# Patient Record
Sex: FEMALE | Race: WHITE | NOT HISPANIC OR LATINO | Employment: OTHER | URBAN - METROPOLITAN AREA
[De-identification: names, ages, dates, MRNs, and addresses within clinical notes are randomized per-mention and may not be internally consistent; named-entity substitution may affect disease eponyms.]

---

## 2022-01-01 ENCOUNTER — HOSPITAL ENCOUNTER (INPATIENT)
Facility: HOSPITAL | Age: 67
LOS: 4 days | DRG: 834 | End: 2022-07-14
Attending: INTERNAL MEDICINE | Admitting: INTERNAL MEDICINE
Payer: MEDICARE

## 2022-01-01 VITALS
WEIGHT: 212.5 LBS | HEIGHT: 61 IN | RESPIRATION RATE: 28 BRPM | TEMPERATURE: 101 F | BODY MASS INDEX: 40.12 KG/M2 | DIASTOLIC BLOOD PRESSURE: 32 MMHG | OXYGEN SATURATION: 84 % | HEART RATE: 124 BPM | SYSTOLIC BLOOD PRESSURE: 72 MMHG

## 2022-01-01 DIAGNOSIS — C95.00 ACUTE LEUKEMIA NOT HAVING ACHIEVED REMISSION: ICD-10-CM

## 2022-01-01 DIAGNOSIS — R07.9 CHEST PAIN: ICD-10-CM

## 2022-01-01 DIAGNOSIS — Z91.89 AT RISK FOR PROLONGED QT INTERVAL SYNDROME: ICD-10-CM

## 2022-01-01 DIAGNOSIS — C95.00 LEUKEMIA, ACUTE: ICD-10-CM

## 2022-01-01 DIAGNOSIS — I48.91 ATRIAL FIBRILLATION: ICD-10-CM

## 2022-01-01 DIAGNOSIS — N17.9 AKI (ACUTE KIDNEY INJURY): ICD-10-CM

## 2022-01-01 DIAGNOSIS — J96.01 ACUTE HYPOXEMIC RESPIRATORY FAILURE: ICD-10-CM

## 2022-01-01 DIAGNOSIS — D69.6 THROMBOCYTOPENIA: ICD-10-CM

## 2022-01-01 DIAGNOSIS — R57.9 SHOCK: ICD-10-CM

## 2022-01-01 LAB
ABO + RH BLD: NORMAL
ADENOVIRUS: NOT DETECTED
ALBUMIN SERPL BCP-MCNC: 1.9 G/DL (ref 3.5–5.2)
ALBUMIN SERPL BCP-MCNC: 2.2 G/DL (ref 3.5–5.2)
ALBUMIN SERPL BCP-MCNC: 2.4 G/DL (ref 3.5–5.2)
ALBUMIN SERPL BCP-MCNC: 2.5 G/DL (ref 3.5–5.2)
ALBUMIN SERPL BCP-MCNC: 3 G/DL (ref 3.5–5.2)
ALBUMIN SERPL BCP-MCNC: 3.1 G/DL (ref 3.5–5.2)
ALLENS TEST: ABNORMAL
ALP SERPL-CCNC: 123 U/L (ref 55–135)
ALP SERPL-CCNC: 147 U/L (ref 55–135)
ALP SERPL-CCNC: 83 U/L (ref 55–135)
ALP SERPL-CCNC: 85 U/L (ref 55–135)
ALP SERPL-CCNC: 87 U/L (ref 55–135)
ALP SERPL-CCNC: 92 U/L (ref 55–135)
ALT SERPL W/O P-5'-P-CCNC: 20 U/L (ref 10–44)
ALT SERPL W/O P-5'-P-CCNC: 219 U/L (ref 10–44)
ALT SERPL W/O P-5'-P-CCNC: 280 U/L (ref 10–44)
ALT SERPL W/O P-5'-P-CCNC: 323 U/L (ref 10–44)
ALT SERPL W/O P-5'-P-CCNC: 35 U/L (ref 10–44)
ALT SERPL W/O P-5'-P-CCNC: 364 U/L (ref 10–44)
ANION GAP SERPL CALC-SCNC: 12 MMOL/L (ref 8–16)
ANION GAP SERPL CALC-SCNC: 13 MMOL/L (ref 8–16)
ANION GAP SERPL CALC-SCNC: 13 MMOL/L (ref 8–16)
ANION GAP SERPL CALC-SCNC: 15 MMOL/L (ref 8–16)
ANION GAP SERPL CALC-SCNC: 17 MMOL/L (ref 8–16)
ANION GAP SERPL CALC-SCNC: 18 MMOL/L (ref 8–16)
ANION GAP SERPL CALC-SCNC: 19 MMOL/L (ref 8–16)
ANISOCYTOSIS BLD QL SMEAR: ABNORMAL
ANISOCYTOSIS BLD QL SMEAR: SLIGHT
APTT BLDCRRT: 29.6 SEC (ref 21–32)
APTT BLDCRRT: 32.6 SEC (ref 21–32)
APTT BLDCRRT: 34.6 SEC (ref 21–32)
AST SERPL-CCNC: 150 U/L (ref 10–40)
AST SERPL-CCNC: 160 U/L (ref 10–40)
AST SERPL-CCNC: 219 U/L (ref 10–40)
AST SERPL-CCNC: 25 U/L (ref 10–40)
AST SERPL-CCNC: 37 U/L (ref 10–40)
AST SERPL-CCNC: 417 U/L (ref 10–40)
AV INDEX (PROSTH): 1.18
AV MEAN GRADIENT: 2 MMHG
AV PEAK GRADIENT: 3 MMHG
AV VALVE AREA: 3.55 CM2
AV VELOCITY RATIO: 1.05
B-OH-BUTYR BLD STRIP-SCNC: 0.1 MMOL/L (ref 0–0.5)
BACTERIA #/AREA URNS AUTO: ABNORMAL /HPF
BACTERIA UR CULT: NO GROWTH
BASO STIPL BLD QL SMEAR: ABNORMAL
BASO STIPL BLD QL SMEAR: ABNORMAL
BASOPHILS # BLD AUTO: ABNORMAL K/UL (ref 0–0.2)
BASOPHILS NFR BLD: 0 % (ref 0–1.9)
BILIRUB SERPL-MCNC: 0.5 MG/DL (ref 0.1–1)
BILIRUB SERPL-MCNC: 0.5 MG/DL (ref 0.1–1)
BILIRUB SERPL-MCNC: 0.9 MG/DL (ref 0.1–1)
BILIRUB SERPL-MCNC: 0.9 MG/DL (ref 0.1–1)
BILIRUB SERPL-MCNC: 1.6 MG/DL (ref 0.1–1)
BILIRUB SERPL-MCNC: 2.3 MG/DL (ref 0.1–1)
BILIRUB UR QL STRIP: NEGATIVE
BLASTS NFR BLD MANUAL: 0 %
BLASTS NFR BLD MANUAL: 11 %
BLASTS NFR BLD MANUAL: 11 %
BLASTS NFR BLD MANUAL: 15 %
BLASTS NFR BLD MANUAL: 18 %
BLASTS NFR BLD MANUAL: 19 %
BLD GP AB SCN CELLS X3 SERPL QL: NORMAL
BLD PROD TYP BPU: NORMAL
BLOOD UNIT EXPIRATION DATE: NORMAL
BLOOD UNIT TYPE CODE: 5100
BLOOD UNIT TYPE: NORMAL
BNP SERPL-MCNC: 215 PG/ML (ref 0–99)
BODY SITE - BONE MARROW: NORMAL
BORDETELLA PARAPERTUSSIS (IS1001): NOT DETECTED
BORDETELLA PERTUSSIS (PTXP): NOT DETECTED
BSA FOR ECHO PROCEDURE: 2.04 M2
BSA FOR ECHO PROCEDURE: 2.04 M2
BUN SERPL-MCNC: 16 MG/DL (ref 8–23)
BUN SERPL-MCNC: 22 MG/DL (ref 8–23)
BUN SERPL-MCNC: 25 MG/DL (ref 8–23)
BUN SERPL-MCNC: 29 MG/DL (ref 8–23)
BUN SERPL-MCNC: 35 MG/DL (ref 8–23)
BUN SERPL-MCNC: 41 MG/DL (ref 8–23)
BUN SERPL-MCNC: 52 MG/DL (ref 8–23)
BURR CELLS BLD QL SMEAR: ABNORMAL
CALCIUM SERPL-MCNC: 7.4 MG/DL (ref 8.7–10.5)
CALCIUM SERPL-MCNC: 8 MG/DL (ref 8.7–10.5)
CALCIUM SERPL-MCNC: 8 MG/DL (ref 8.7–10.5)
CALCIUM SERPL-MCNC: 8.4 MG/DL (ref 8.7–10.5)
CALCIUM SERPL-MCNC: 8.4 MG/DL (ref 8.7–10.5)
CALCIUM SERPL-MCNC: 8.5 MG/DL (ref 8.7–10.5)
CALCIUM SERPL-MCNC: 8.6 MG/DL (ref 8.7–10.5)
CHLAMYDIA PNEUMONIAE: NOT DETECTED
CHLORIDE SERPL-SCNC: 94 MMOL/L (ref 95–110)
CHLORIDE SERPL-SCNC: 95 MMOL/L (ref 95–110)
CHLORIDE SERPL-SCNC: 95 MMOL/L (ref 95–110)
CHLORIDE SERPL-SCNC: 96 MMOL/L (ref 95–110)
CHLORIDE SERPL-SCNC: 96 MMOL/L (ref 95–110)
CHLORIDE SERPL-SCNC: 97 MMOL/L (ref 95–110)
CHLORIDE SERPL-SCNC: 98 MMOL/L (ref 95–110)
CLARITY UR REFRACT.AUTO: ABNORMAL
CLINICAL DIAGNOSIS - BONE MARROW: NORMAL
CO2 SERPL-SCNC: 13 MMOL/L (ref 23–29)
CO2 SERPL-SCNC: 15 MMOL/L (ref 23–29)
CO2 SERPL-SCNC: 15 MMOL/L (ref 23–29)
CO2 SERPL-SCNC: 17 MMOL/L (ref 23–29)
CO2 SERPL-SCNC: 19 MMOL/L (ref 23–29)
CO2 SERPL-SCNC: 19 MMOL/L (ref 23–29)
CO2 SERPL-SCNC: 20 MMOL/L (ref 23–29)
CODING SYSTEM: NORMAL
COLOR UR AUTO: ABNORMAL
COMMENT: NORMAL
CORONAVIRUS 229E, COMMON COLD VIRUS: NOT DETECTED
CORONAVIRUS HKU1, COMMON COLD VIRUS: NOT DETECTED
CORONAVIRUS NL63, COMMON COLD VIRUS: NOT DETECTED
CORONAVIRUS OC43, COMMON COLD VIRUS: NOT DETECTED
CREAT SERPL-MCNC: 0.8 MG/DL (ref 0.5–1.4)
CREAT SERPL-MCNC: 0.8 MG/DL (ref 0.5–1.4)
CREAT SERPL-MCNC: 1.2 MG/DL (ref 0.5–1.4)
CREAT SERPL-MCNC: 1.3 MG/DL (ref 0.5–1.4)
CREAT SERPL-MCNC: 1.4 MG/DL (ref 0.5–1.4)
CREAT SERPL-MCNC: 1.8 MG/DL (ref 0.5–1.4)
CREAT SERPL-MCNC: 3 MG/DL (ref 0.5–1.4)
CV ECHO LV RWT: 0.32 CM
D DIMER PPP IA.FEU-MCNC: 12.46 MG/L FEU
DACRYOCYTES BLD QL SMEAR: ABNORMAL
DACRYOCYTES BLD QL SMEAR: ABNORMAL
DELSYS: ABNORMAL
DIFFERENTIAL METHOD: ABNORMAL
DISPENSE STATUS: NORMAL
DOHLE BOD BLD QL SMEAR: PRESENT
DOP CALC AO PEAK VEL: 0.82 M/S
DOP CALC AO VTI: 12.47 CM
DOP CALC LVOT AREA: 3 CM2
DOP CALC LVOT DIAMETER: 1.96 CM
DOP CALC LVOT PEAK VEL: 0.86 M/S
DOP CALC LVOT STROKE VOLUME: 44.27 CM3
DOP CALCLVOT PEAK VEL VTI: 14.68 CM
E WAVE DECELERATION TIME: 118.61 MSEC
E/A RATIO: 0.7
ECHO LV POSTERIOR WALL: 0.66 CM (ref 0.6–1.1)
EJECTION FRACTION: 63 %
EOSINOPHIL # BLD AUTO: ABNORMAL K/UL (ref 0–0.5)
EOSINOPHIL NFR BLD: 0 % (ref 0–8)
EP: 6
ERYTHROCYTE [DISTWIDTH] IN BLOOD BY AUTOMATED COUNT: 17.2 % (ref 11.5–14.5)
ERYTHROCYTE [DISTWIDTH] IN BLOOD BY AUTOMATED COUNT: 17.4 % (ref 11.5–14.5)
ERYTHROCYTE [DISTWIDTH] IN BLOOD BY AUTOMATED COUNT: 17.6 % (ref 11.5–14.5)
ERYTHROCYTE [DISTWIDTH] IN BLOOD BY AUTOMATED COUNT: 17.8 % (ref 11.5–14.5)
ERYTHROCYTE [DISTWIDTH] IN BLOOD BY AUTOMATED COUNT: 18.5 % (ref 11.5–14.5)
ERYTHROCYTE [DISTWIDTH] IN BLOOD BY AUTOMATED COUNT: 18.7 % (ref 11.5–14.5)
ERYTHROCYTE [DISTWIDTH] IN BLOOD BY AUTOMATED COUNT: 19.4 % (ref 11.5–14.5)
ERYTHROCYTE [DISTWIDTH] IN BLOOD BY AUTOMATED COUNT: 19.6 % (ref 11.5–14.5)
ERYTHROCYTE [DISTWIDTH] IN BLOOD BY AUTOMATED COUNT: 19.6 % (ref 11.5–14.5)
ERYTHROCYTE [DISTWIDTH] IN BLOOD BY AUTOMATED COUNT: 20 % (ref 11.5–14.5)
ERYTHROCYTE [DISTWIDTH] IN BLOOD BY AUTOMATED COUNT: 20.7 % (ref 11.5–14.5)
ERYTHROCYTE [SEDIMENTATION RATE] IN BLOOD BY WESTERGREN METHOD: 12 MM/H
ERYTHROCYTE [SEDIMENTATION RATE] IN BLOOD BY WESTERGREN METHOD: 26 MM/H
EST. GFR  (AFRICAN AMERICAN): 17.7 ML/MIN/1.73 M^2
EST. GFR  (AFRICAN AMERICAN): 32.9 ML/MIN/1.73 M^2
EST. GFR  (AFRICAN AMERICAN): 44.5 ML/MIN/1.73 M^2
EST. GFR  (AFRICAN AMERICAN): 48.7 ML/MIN/1.73 M^2
EST. GFR  (AFRICAN AMERICAN): 53.7 ML/MIN/1.73 M^2
EST. GFR  (AFRICAN AMERICAN): >60 ML/MIN/1.73 M^2
EST. GFR  (AFRICAN AMERICAN): >60 ML/MIN/1.73 M^2
EST. GFR  (NON AFRICAN AMERICAN): 15.4 ML/MIN/1.73 M^2
EST. GFR  (NON AFRICAN AMERICAN): 28.5 ML/MIN/1.73 M^2
EST. GFR  (NON AFRICAN AMERICAN): 38.6 ML/MIN/1.73 M^2
EST. GFR  (NON AFRICAN AMERICAN): 42.3 ML/MIN/1.73 M^2
EST. GFR  (NON AFRICAN AMERICAN): 46.5 ML/MIN/1.73 M^2
EST. GFR  (NON AFRICAN AMERICAN): >60 ML/MIN/1.73 M^2
EST. GFR  (NON AFRICAN AMERICAN): >60 ML/MIN/1.73 M^2
ESTIMATED AVG GLUCOSE: 137 MG/DL (ref 68–131)
FERRITIN SERPL-MCNC: 2795 NG/ML (ref 20–300)
FIBRINOGEN PPP-MCNC: 415 MG/DL (ref 182–400)
FIBRINOGEN PPP-MCNC: 478 MG/DL (ref 182–400)
FIBRINOGEN PPP-MCNC: 691 MG/DL (ref 182–400)
FINAL PATHOLOGIC DIAGNOSIS: NORMAL
FIO2: 100
FIO2: 40
FIO2: 50
FIO2: 60
FIO2: 70
FIO2: 80
FLOW CYTOMETRY ANTIBODIES ANALYZED - BLOOD: NORMAL
FLOW CYTOMETRY ANTIBODIES ANALYZED - BONE MARROW: NORMAL
FLOW CYTOMETRY COMMENT - BLOOD: NORMAL
FLOW CYTOMETRY COMMENT - BONE MARROW: NORMAL
FLOW CYTOMETRY INTERPRETATION - BLOOD: NORMAL
FLOW CYTOMETRY INTERPRETATION - BONE MARROW: NORMAL
FLT3 RESULT: NORMAL
FLUBV RNA NPH QL NAA+NON-PROBE: NOT DETECTED
FRACTIONAL SHORTENING: 21 % (ref 28–44)
GIANT PLATELETS BLD QL SMEAR: PRESENT
GLUCOSE SERPL-MCNC: 116 MG/DL (ref 70–110)
GLUCOSE SERPL-MCNC: 133 MG/DL (ref 70–110)
GLUCOSE SERPL-MCNC: 133 MG/DL (ref 70–110)
GLUCOSE SERPL-MCNC: 139 MG/DL (ref 70–110)
GLUCOSE SERPL-MCNC: 159 MG/DL (ref 70–110)
GLUCOSE SERPL-MCNC: 175 MG/DL (ref 70–110)
GLUCOSE SERPL-MCNC: 230 MG/DL (ref 70–110)
GLUCOSE UR QL STRIP: ABNORMAL
GROSS: NORMAL
HAV IGM SERPL QL IA: NEGATIVE
HBA1C MFR BLD: 6.4 % (ref 4–5.6)
HBV CORE IGM SERPL QL IA: NEGATIVE
HBV SURFACE AG SERPL QL IA: NEGATIVE
HCO3 UR-SCNC: 12.7 MMOL/L (ref 24–28)
HCO3 UR-SCNC: 15.7 MMOL/L (ref 24–28)
HCO3 UR-SCNC: 15.8 MMOL/L (ref 24–28)
HCO3 UR-SCNC: 16.9 MMOL/L (ref 24–28)
HCO3 UR-SCNC: 20.6 MMOL/L (ref 24–28)
HCO3 UR-SCNC: 21.9 MMOL/L (ref 24–28)
HCO3 UR-SCNC: 23.3 MMOL/L (ref 24–28)
HCO3 UR-SCNC: 23.5 MMOL/L (ref 24–28)
HCT VFR BLD AUTO: 18.5 % (ref 37–48.5)
HCT VFR BLD AUTO: 19.9 % (ref 37–48.5)
HCT VFR BLD AUTO: 19.9 % (ref 37–48.5)
HCT VFR BLD AUTO: 20.4 % (ref 37–48.5)
HCT VFR BLD AUTO: 20.5 % (ref 37–48.5)
HCT VFR BLD AUTO: 20.9 % (ref 37–48.5)
HCT VFR BLD AUTO: 23 % (ref 37–48.5)
HCT VFR BLD AUTO: 23.3 % (ref 37–48.5)
HCT VFR BLD AUTO: 23.5 % (ref 37–48.5)
HCT VFR BLD AUTO: 23.8 % (ref 37–48.5)
HCT VFR BLD AUTO: 24.4 % (ref 37–48.5)
HCT VFR BLD AUTO: 25.6 % (ref 37–48.5)
HCT VFR BLD AUTO: 26.9 % (ref 37–48.5)
HCT VFR BLD CALC: 23 %PCV (ref 36–54)
HCT VFR BLD CALC: 26 %PCV (ref 36–54)
HCV AB SERPL QL IA: NEGATIVE
HGB BLD-MCNC: 6.3 G/DL (ref 12–16)
HGB BLD-MCNC: 6.8 G/DL (ref 12–16)
HGB BLD-MCNC: 6.9 G/DL (ref 12–16)
HGB BLD-MCNC: 7 G/DL (ref 12–16)
HGB BLD-MCNC: 7.1 G/DL (ref 12–16)
HGB BLD-MCNC: 7.3 G/DL (ref 12–16)
HGB BLD-MCNC: 7.9 G/DL (ref 12–16)
HGB BLD-MCNC: 7.9 G/DL (ref 12–16)
HGB BLD-MCNC: 8.1 G/DL (ref 12–16)
HGB BLD-MCNC: 8.2 G/DL (ref 12–16)
HGB BLD-MCNC: 8.3 G/DL (ref 12–16)
HGB BLD-MCNC: 8.7 G/DL (ref 12–16)
HGB BLD-MCNC: 9 G/DL (ref 12–16)
HGB UR QL STRIP: ABNORMAL
HIV 1+2 AB+HIV1 P24 AG SERPL QL IA: NEGATIVE
HPIV1 RNA NPH QL NAA+NON-PROBE: NOT DETECTED
HPIV2 RNA NPH QL NAA+NON-PROBE: NOT DETECTED
HPIV3 RNA NPH QL NAA+NON-PROBE: NOT DETECTED
HPIV4 RNA NPH QL NAA+NON-PROBE: NOT DETECTED
HUMAN METAPNEUMOVIRUS: NOT DETECTED
HYALINE CASTS UR QL AUTO: 34 /LPF
HYPOCHROMIA BLD QL SMEAR: ABNORMAL
IMM GRANULOCYTES # BLD AUTO: ABNORMAL K/UL (ref 0–0.04)
IMM GRANULOCYTES NFR BLD AUTO: ABNORMAL % (ref 0–0.5)
INFLUENZA A (SUBTYPES H1,H1-2009,H3): NOT DETECTED
INR PPP: 1.2 (ref 0.8–1.2)
INR PPP: 1.3 (ref 0.8–1.2)
INR PPP: 1.3 (ref 0.8–1.2)
INR PPP: 1.4 (ref 0.8–1.2)
INTERVENTRICULAR SEPTUM: 0.69 CM (ref 0.6–1.1)
IP: 12
IP: 12
IP: 14
IRON SERPL-MCNC: 75 UG/DL (ref 30–160)
KETONES UR QL STRIP: ABNORMAL
LACTATE SERPL-SCNC: 1.4 MMOL/L (ref 0.5–2.2)
LACTATE SERPL-SCNC: 2.3 MMOL/L (ref 0.5–2.2)
LACTATE SERPL-SCNC: 2.4 MMOL/L (ref 0.5–2.2)
LACTATE SERPL-SCNC: 2.8 MMOL/L (ref 0.5–2.2)
LACTATE SERPL-SCNC: 2.9 MMOL/L (ref 0.5–2.2)
LACTATE SERPL-SCNC: 4.3 MMOL/L (ref 0.5–2.2)
LACTATE SERPL-SCNC: 4.8 MMOL/L (ref 0.5–2.2)
LACTATE SERPL-SCNC: 5.5 MMOL/L (ref 0.5–2.2)
LACTATE SERPL-SCNC: 8.3 MMOL/L (ref 0.5–2.2)
LACTATE SERPL-SCNC: 9.8 MMOL/L (ref 0.5–2.2)
LDH SERPL L TO P-CCNC: 1059 U/L (ref 110–260)
LDH SERPL L TO P-CCNC: 1448 U/L (ref 110–260)
LDH SERPL L TO P-CCNC: 646 U/L (ref 110–260)
LEFT ATRIUM SIZE: 2.63 CM
LEFT INTERNAL DIMENSION IN SYSTOLE: 3.27 CM (ref 2.1–4)
LEFT VENTRICLE DIASTOLIC VOLUME INDEX: 39.44 ML/M2
LEFT VENTRICLE DIASTOLIC VOLUME: 76.51 ML
LEFT VENTRICLE MASS INDEX: 41 G/M2
LEFT VENTRICLE SYSTOLIC VOLUME INDEX: 22.3 ML/M2
LEFT VENTRICLE SYSTOLIC VOLUME: 43.3 ML
LEFT VENTRICULAR INTERNAL DIMENSION IN DIASTOLE: 4.15 CM (ref 3.5–6)
LEFT VENTRICULAR MASS: 79.56 G
LEUKOCYTE ESTERASE UR QL STRIP: NEGATIVE
LIPASE SERPL-CCNC: <4 U/L (ref 4–60)
LV LATERAL E/E' RATIO: 7.5 M/S
LYMPHOCYTES # BLD AUTO: ABNORMAL K/UL (ref 1–4.8)
LYMPHOCYTES NFR BLD: 10 % (ref 18–48)
LYMPHOCYTES NFR BLD: 12 % (ref 18–48)
LYMPHOCYTES NFR BLD: 13 % (ref 18–48)
LYMPHOCYTES NFR BLD: 14 % (ref 18–48)
LYMPHOCYTES NFR BLD: 16 % (ref 18–48)
LYMPHOCYTES NFR BLD: 18 % (ref 18–48)
LYMPHOCYTES NFR BLD: 18 % (ref 18–48)
LYMPHOCYTES NFR BLD: 19 % (ref 18–48)
LYMPHOCYTES NFR BLD: 19 % (ref 18–48)
LYMPHOCYTES NFR BLD: 21 % (ref 18–48)
LYMPHOCYTES NFR BLD: 25 % (ref 18–48)
LYMPHOCYTES NFR BLD: 26 % (ref 18–48)
LYMPHOCYTES NFR BLD: 9 % (ref 18–48)
Lab: NORMAL
MAGNESIUM SERPL-MCNC: 1.6 MG/DL (ref 1.6–2.6)
MAGNESIUM SERPL-MCNC: 2.4 MG/DL (ref 1.6–2.6)
MAGNESIUM SERPL-MCNC: 2.4 MG/DL (ref 1.6–2.6)
MCH RBC QN AUTO: 31.8 PG (ref 27–31)
MCH RBC QN AUTO: 32 PG (ref 27–31)
MCH RBC QN AUTO: 32 PG (ref 27–31)
MCH RBC QN AUTO: 32.1 PG (ref 27–31)
MCH RBC QN AUTO: 32.3 PG (ref 27–31)
MCH RBC QN AUTO: 32.6 PG (ref 27–31)
MCH RBC QN AUTO: 33 PG (ref 27–31)
MCH RBC QN AUTO: 33.2 PG (ref 27–31)
MCH RBC QN AUTO: 33.2 PG (ref 27–31)
MCH RBC QN AUTO: 33.3 PG (ref 27–31)
MCH RBC QN AUTO: 33.3 PG (ref 27–31)
MCH RBC QN AUTO: 34.1 PG (ref 27–31)
MCH RBC QN AUTO: 34.5 PG (ref 27–31)
MCHC RBC AUTO-ENTMCNC: 33.5 G/DL (ref 32–36)
MCHC RBC AUTO-ENTMCNC: 33.6 G/DL (ref 32–36)
MCHC RBC AUTO-ENTMCNC: 33.7 G/DL (ref 32–36)
MCHC RBC AUTO-ENTMCNC: 33.9 G/DL (ref 32–36)
MCHC RBC AUTO-ENTMCNC: 34 G/DL (ref 32–36)
MCHC RBC AUTO-ENTMCNC: 34.1 G/DL (ref 32–36)
MCHC RBC AUTO-ENTMCNC: 34.2 G/DL (ref 32–36)
MCHC RBC AUTO-ENTMCNC: 34.3 G/DL (ref 32–36)
MCHC RBC AUTO-ENTMCNC: 34.9 G/DL (ref 32–36)
MCHC RBC AUTO-ENTMCNC: 35.7 G/DL (ref 32–36)
MCHC RBC AUTO-ENTMCNC: 35.7 G/DL (ref 32–36)
MCV RBC AUTO: 100 FL (ref 82–98)
MCV RBC AUTO: 101 FL (ref 82–98)
MCV RBC AUTO: 91 FL (ref 82–98)
MCV RBC AUTO: 93 FL (ref 82–98)
MCV RBC AUTO: 94 FL (ref 82–98)
MCV RBC AUTO: 95 FL (ref 82–98)
MCV RBC AUTO: 96 FL (ref 82–98)
MCV RBC AUTO: 97 FL (ref 82–98)
MCV RBC AUTO: 99 FL (ref 82–98)
METAMYELOCYTES NFR BLD MANUAL: 1 %
METAMYELOCYTES NFR BLD MANUAL: 2 %
METAMYELOCYTES NFR BLD MANUAL: 3 %
MICROSCOPIC COMMENT: ABNORMAL
MICROSCOPIC EXAM: NORMAL
MIN VOL: 11.3
MIN VOL: 8.3
MIN VOL: 9
MODE: ABNORMAL
MONOCYTES # BLD AUTO: ABNORMAL K/UL (ref 0.3–1)
MONOCYTES NFR BLD: 10 % (ref 4–15)
MONOCYTES NFR BLD: 11 % (ref 4–15)
MONOCYTES NFR BLD: 12 % (ref 4–15)
MONOCYTES NFR BLD: 14 % (ref 4–15)
MONOCYTES NFR BLD: 17 % (ref 4–15)
MONOCYTES NFR BLD: 4 % (ref 4–15)
MONOCYTES NFR BLD: 5 % (ref 4–15)
MONOCYTES NFR BLD: 6 % (ref 4–15)
MONOCYTES NFR BLD: 6 % (ref 4–15)
MONOCYTES NFR BLD: 7 % (ref 4–15)
MONOCYTES NFR BLD: 7 % (ref 4–15)
MONOCYTES NFR BLD: 9 % (ref 4–15)
MONOCYTES NFR BLD: 9 % (ref 4–15)
MV PEAK A VEL: 0.64 M/S
MV PEAK E VEL: 0.45 M/S
MV STENOSIS PRESSURE HALF TIME: 34.4 MS
MV VALVE AREA P 1/2 METHOD: 6.4 CM2
MYCOPLASMA PNEUMONIAE: NOT DETECTED
MYELOCYTES NFR BLD MANUAL: 1 %
MYELOCYTES NFR BLD MANUAL: 2 %
MYELOCYTES NFR BLD MANUAL: 2 %
MYELOCYTES NFR BLD MANUAL: 3 %
MYELOCYTES NFR BLD MANUAL: 4 %
MYELOCYTES NFR BLD MANUAL: 5 %
MYELOCYTES NFR BLD MANUAL: 6 %
NEUTROPHILS NFR BLD: 13 % (ref 38–73)
NEUTROPHILS NFR BLD: 18 % (ref 38–73)
NEUTROPHILS NFR BLD: 25 % (ref 38–73)
NEUTROPHILS NFR BLD: 32 % (ref 38–73)
NEUTROPHILS NFR BLD: 33 % (ref 38–73)
NEUTROPHILS NFR BLD: 34 % (ref 38–73)
NEUTROPHILS NFR BLD: 34 % (ref 38–73)
NEUTROPHILS NFR BLD: 39 % (ref 38–73)
NEUTROPHILS NFR BLD: 45 % (ref 38–73)
NEUTROPHILS NFR BLD: 46 % (ref 38–73)
NEUTROPHILS NFR BLD: 51 % (ref 38–73)
NEUTROPHILS NFR BLD: 56 % (ref 38–73)
NEUTROPHILS NFR BLD: 58 % (ref 38–73)
NEUTS BAND NFR BLD MANUAL: 1 %
NEUTS BAND NFR BLD MANUAL: 2 %
NEUTS BAND NFR BLD MANUAL: 2 %
NEUTS BAND NFR BLD MANUAL: 3 %
NEUTS BAND NFR BLD MANUAL: 3 %
NEUTS BAND NFR BLD MANUAL: 4 %
NEUTS BAND NFR BLD MANUAL: 5 %
NEUTS BAND NFR BLD MANUAL: 6 %
NEUTS BAND NFR BLD MANUAL: 6 %
NEUTS BAND NFR BLD MANUAL: 7 %
NEUTS BAND NFR BLD MANUAL: 7 %
NITRITE UR QL STRIP: NEGATIVE
NRBC BLD-RTO: 0 /100 WBC
NRBC BLD-RTO: 1 /100 WBC
NUM UNITS TRANS PACKED RBC: NORMAL
NUM UNITS TRANS PACKED RBC: NORMAL
OSMOLALITY SERPL: 277 MOSM/KG (ref 275–295)
OVALOCYTES BLD QL SMEAR: ABNORMAL
PATH REPORT.FINAL DX SPEC: NORMAL
PATH REV BLD -IMP: NORMAL
PCO2 BLDA: 26.3 MMHG (ref 35–45)
PCO2 BLDA: 28.5 MMHG (ref 35–45)
PCO2 BLDA: 30.3 MMHG (ref 35–45)
PCO2 BLDA: 31 MMHG (ref 35–45)
PCO2 BLDA: 32.1 MMHG (ref 35–45)
PCO2 BLDA: 35.6 MMHG (ref 35–45)
PCO2 BLDA: 36.1 MMHG (ref 35–45)
PCO2 BLDA: 45.9 MMHG (ref 35–45)
PEEP: 10
PEEP: 8
PEEP: 8
PH SMN: 7.17 [PH] (ref 7.35–7.45)
PH SMN: 7.2 [PH] (ref 7.35–7.45)
PH SMN: 7.33 [PH] (ref 7.35–7.45)
PH SMN: 7.35 [PH] (ref 7.35–7.45)
PH SMN: 7.37 [PH] (ref 7.35–7.45)
PH SMN: 7.42 [PH] (ref 7.35–7.45)
PH SMN: 7.46 [PH] (ref 7.35–7.45)
PH SMN: 7.55 [PH] (ref 7.35–7.45)
PH UR STRIP: 5 [PH] (ref 5–8)
PHOSPHATE SERPL-MCNC: 2.1 MG/DL (ref 2.7–4.5)
PHOSPHATE SERPL-MCNC: 2.1 MG/DL (ref 2.7–4.5)
PHOSPHATE SERPL-MCNC: 2.7 MG/DL (ref 2.7–4.5)
PHOSPHATE SERPL-MCNC: 3 MG/DL (ref 2.7–4.5)
PIP: 28
PIP: 35
PLATELET # BLD AUTO: 13 K/UL (ref 150–450)
PLATELET # BLD AUTO: 14 K/UL (ref 150–450)
PLATELET # BLD AUTO: 15 K/UL (ref 150–450)
PLATELET # BLD AUTO: 19 K/UL (ref 150–450)
PLATELET # BLD AUTO: 20 K/UL (ref 150–450)
PLATELET # BLD AUTO: 22 K/UL (ref 150–450)
PLATELET # BLD AUTO: 28 K/UL (ref 150–450)
PLATELET # BLD AUTO: 37 K/UL (ref 150–450)
PLATELET # BLD AUTO: 39 K/UL (ref 150–450)
PLATELET # BLD AUTO: 40 K/UL (ref 150–450)
PLATELET # BLD AUTO: 41 K/UL (ref 150–450)
PLATELET BLD QL SMEAR: ABNORMAL
PMV BLD AUTO: 10 FL (ref 9.2–12.9)
PMV BLD AUTO: 10.2 FL (ref 9.2–12.9)
PMV BLD AUTO: 10.7 FL (ref 9.2–12.9)
PMV BLD AUTO: 10.9 FL (ref 9.2–12.9)
PMV BLD AUTO: 11.5 FL (ref 9.2–12.9)
PMV BLD AUTO: 11.9 FL (ref 9.2–12.9)
PMV BLD AUTO: 12.2 FL (ref 9.2–12.9)
PMV BLD AUTO: 12.3 FL (ref 9.2–12.9)
PMV BLD AUTO: 12.6 FL (ref 9.2–12.9)
PMV BLD AUTO: 8.9 FL (ref 9.2–12.9)
PMV BLD AUTO: ABNORMAL FL (ref 9.2–12.9)
PO2 BLDA: 103 MMHG (ref 80–100)
PO2 BLDA: 114 MMHG (ref 80–100)
PO2 BLDA: 29 MMHG (ref 40–60)
PO2 BLDA: 60 MMHG (ref 80–100)
PO2 BLDA: 74 MMHG (ref 80–100)
PO2 BLDA: 84 MMHG (ref 80–100)
PO2 BLDA: 85 MMHG (ref 80–100)
PO2 BLDA: 90 MMHG (ref 80–100)
POC BE: -1 MMOL/L
POC BE: -10 MMOL/L
POC BE: -10 MMOL/L
POC BE: -12 MMOL/L
POC BE: -15 MMOL/L
POC BE: -2 MMOL/L
POC BE: -5 MMOL/L
POC BE: 1 MMOL/L
POC IONIZED CALCIUM: 1.11 MMOL/L (ref 1.06–1.42)
POC SATURATED O2: 41 % (ref 95–100)
POC SATURATED O2: 89 % (ref 95–100)
POC SATURATED O2: 91 % (ref 95–100)
POC SATURATED O2: 96 % (ref 95–100)
POC SATURATED O2: 97 % (ref 95–100)
POC SATURATED O2: 98 % (ref 95–100)
POC TCO2: 14 MMOL/L (ref 23–27)
POC TCO2: 17 MMOL/L (ref 23–27)
POC TCO2: 17 MMOL/L (ref 23–27)
POC TCO2: 18 MMOL/L (ref 24–29)
POC TCO2: 22 MMOL/L (ref 23–27)
POC TCO2: 23 MMOL/L (ref 23–27)
POC TCO2: 24 MMOL/L (ref 23–27)
POC TCO2: 25 MMOL/L (ref 23–27)
POCT GLUCOSE: 106 MG/DL (ref 70–110)
POCT GLUCOSE: 123 MG/DL (ref 70–110)
POCT GLUCOSE: 130 MG/DL (ref 70–110)
POCT GLUCOSE: 135 MG/DL (ref 70–110)
POCT GLUCOSE: 137 MG/DL (ref 70–110)
POCT GLUCOSE: 147 MG/DL (ref 70–110)
POCT GLUCOSE: 151 MG/DL (ref 70–110)
POCT GLUCOSE: 153 MG/DL (ref 70–110)
POCT GLUCOSE: 180 MG/DL (ref 70–110)
POCT GLUCOSE: 181 MG/DL (ref 70–110)
POCT GLUCOSE: 212 MG/DL (ref 70–110)
POCT GLUCOSE: 217 MG/DL (ref 70–110)
POCT GLUCOSE: 238 MG/DL (ref 70–110)
POCT GLUCOSE: 267 MG/DL (ref 70–110)
POCT GLUCOSE: 69 MG/DL (ref 70–110)
POCT GLUCOSE: <20 MG/DL (ref 70–110)
POIKILOCYTOSIS BLD QL SMEAR: ABNORMAL
POIKILOCYTOSIS BLD QL SMEAR: SLIGHT
POLYCHROMASIA BLD QL SMEAR: ABNORMAL
POTASSIUM BLD-SCNC: 3.5 MMOL/L (ref 3.5–5.1)
POTASSIUM SERPL-SCNC: 3.6 MMOL/L (ref 3.5–5.1)
POTASSIUM SERPL-SCNC: 3.6 MMOL/L (ref 3.5–5.1)
POTASSIUM SERPL-SCNC: 3.8 MMOL/L (ref 3.5–5.1)
POTASSIUM SERPL-SCNC: 4 MMOL/L (ref 3.5–5.1)
POTASSIUM SERPL-SCNC: 4.2 MMOL/L (ref 3.5–5.1)
POTASSIUM SERPL-SCNC: 4.5 MMOL/L (ref 3.5–5.1)
POTASSIUM SERPL-SCNC: 5 MMOL/L (ref 3.5–5.1)
PROCALCITONIN SERPL IA-MCNC: 4.66 NG/ML
PROMYELOCYTES NFR BLD MANUAL: 1 %
PROMYELOCYTES NFR BLD MANUAL: 2 %
PROMYELOCYTES NFR BLD MANUAL: 2 %
PROT SERPL-MCNC: 5.9 G/DL (ref 6–8.4)
PROT SERPL-MCNC: 6.2 G/DL (ref 6–8.4)
PROT SERPL-MCNC: 6.3 G/DL (ref 6–8.4)
PROT SERPL-MCNC: 6.3 G/DL (ref 6–8.4)
PROT SERPL-MCNC: 6.5 G/DL (ref 6–8.4)
PROT SERPL-MCNC: 6.9 G/DL (ref 6–8.4)
PROT UR QL STRIP: ABNORMAL
PROTHROMBIN TIME: 12.7 SEC (ref 9–12.5)
PROTHROMBIN TIME: 12.9 SEC (ref 9–12.5)
PROTHROMBIN TIME: 13.1 SEC (ref 9–12.5)
PROTHROMBIN TIME: 14.3 SEC (ref 9–12.5)
RBC # BLD AUTO: 1.9 M/UL (ref 4–5.4)
RBC # BLD AUTO: 2.08 M/UL (ref 4–5.4)
RBC # BLD AUTO: 2.08 M/UL (ref 4–5.4)
RBC # BLD AUTO: 2.1 M/UL (ref 4–5.4)
RBC # BLD AUTO: 2.12 M/UL (ref 4–5.4)
RBC # BLD AUTO: 2.24 M/UL (ref 4–5.4)
RBC # BLD AUTO: 2.35 M/UL (ref 4–5.4)
RBC # BLD AUTO: 2.47 M/UL (ref 4–5.4)
RBC # BLD AUTO: 2.47 M/UL (ref 4–5.4)
RBC # BLD AUTO: 2.54 M/UL (ref 4–5.4)
RBC # BLD AUTO: 2.61 M/UL (ref 4–5.4)
RBC # BLD AUTO: 2.64 M/UL (ref 4–5.4)
RBC # BLD AUTO: 2.7 M/UL (ref 4–5.4)
RBC #/AREA URNS AUTO: 1 /HPF (ref 0–4)
RESPIRATORY INFECTION PANEL SOURCE: NORMAL
RSV RNA NPH QL NAA+NON-PROBE: NOT DETECTED
RV+EV RNA NPH QL NAA+NON-PROBE: NOT DETECTED
SAMPLE: ABNORMAL
SARS-COV-2 RNA RESP QL NAA+PROBE: NOT DETECTED
SATURATED IRON: 31 % (ref 20–50)
SCHISTOCYTES BLD QL SMEAR: ABNORMAL
SCHISTOCYTES BLD QL SMEAR: PRESENT
SCHISTOCYTES BLD QL SMEAR: PRESENT
SINUS: 3.55 CM
SITE: ABNORMAL
SMUDGE CELLS BLD QL SMEAR: PRESENT
SODIUM BLD-SCNC: 130 MMOL/L (ref 136–145)
SODIUM SERPL-SCNC: 124 MMOL/L (ref 136–145)
SODIUM SERPL-SCNC: 126 MMOL/L (ref 136–145)
SODIUM SERPL-SCNC: 127 MMOL/L (ref 136–145)
SODIUM SERPL-SCNC: 129 MMOL/L (ref 136–145)
SODIUM SERPL-SCNC: 132 MMOL/L (ref 136–145)
SP GR UR STRIP: >1.03 (ref 1–1.03)
SP02: 90
SP02: 91
SP02: 94
SP02: 95
SPECIMEN TYPE: NORMAL
SPHEROCYTES BLD QL SMEAR: ABNORMAL
SPHEROCYTES BLD QL SMEAR: ABNORMAL
SPONT RATE: 22
SQUAMOUS #/AREA URNS AUTO: 2 /HPF
TDI LATERAL: 0.06 M/S
TOTAL IRON BINDING CAPACITY: 244 UG/DL (ref 250–450)
TRANSFERRIN SERPL-MCNC: 165 MG/DL (ref 200–375)
TROPONIN I SERPL DL<=0.01 NG/ML-MCNC: 0.09 NG/ML (ref 0–0.03)
TROPONIN I SERPL DL<=0.01 NG/ML-MCNC: 0.1 NG/ML (ref 0–0.03)
TROPONIN I SERPL DL<=0.01 NG/ML-MCNC: 0.11 NG/ML (ref 0–0.03)
TROPONIN I SERPL DL<=0.01 NG/ML-MCNC: 0.11 NG/ML (ref 0–0.03)
UNIT NUMBER: NORMAL
URATE SERPL-MCNC: 5.4 MG/DL (ref 2.4–5.7)
URATE SERPL-MCNC: 8 MG/DL (ref 2.4–5.7)
URATE SERPL-MCNC: 9.2 MG/DL (ref 2.4–5.7)
URN SPEC COLLECT METH UR: ABNORMAL
VANCOMYCIN SERPL-MCNC: 8.3 UG/ML
VANCOMYCIN SERPL-MCNC: 9.4 UG/ML
VT: 340
WBC # BLD AUTO: 14.92 K/UL (ref 3.9–12.7)
WBC # BLD AUTO: 15.48 K/UL (ref 3.9–12.7)
WBC # BLD AUTO: 16.68 K/UL (ref 3.9–12.7)
WBC # BLD AUTO: 17.32 K/UL (ref 3.9–12.7)
WBC # BLD AUTO: 17.84 K/UL (ref 3.9–12.7)
WBC # BLD AUTO: 18.19 K/UL (ref 3.9–12.7)
WBC # BLD AUTO: 18.24 K/UL (ref 3.9–12.7)
WBC # BLD AUTO: 21 K/UL (ref 3.9–12.7)
WBC # BLD AUTO: 21.28 K/UL (ref 3.9–12.7)
WBC # BLD AUTO: 23.78 K/UL (ref 3.9–12.7)
WBC # BLD AUTO: 25.82 K/UL (ref 3.9–12.7)
WBC # BLD AUTO: 26.37 K/UL (ref 3.9–12.7)
WBC # BLD AUTO: 38.19 K/UL (ref 3.9–12.7)
WBC #/AREA URNS AUTO: 5 /HPF (ref 0–5)
WBC OTHER NFR BLD MANUAL: 17 %
WBC OTHER NFR BLD MANUAL: 19 %
WBC OTHER NFR BLD MANUAL: 23 %
WBC OTHER NFR BLD MANUAL: 24 %
WBC OTHER NFR BLD MANUAL: 26 %
WBC OTHER NFR BLD MANUAL: 31 %
WBC OTHER NFR BLD MANUAL: 32 %
WBC OTHER NFR BLD MANUAL: 33 %
WBC OTHER NFR BLD MANUAL: 45 %
WBC OTHER NFR BLD MANUAL: 50 %
WBC TOXIC VACUOLES BLD QL SMEAR: PRESENT

## 2022-01-01 PROCEDURE — 99233 SBSQ HOSP IP/OBS HIGH 50: CPT | Mod: ,,, | Performed by: INTERNAL MEDICINE

## 2022-01-01 PROCEDURE — 88184 FLOWCYTOMETRY/ TC 1 MARKER: CPT | Performed by: PATHOLOGY

## 2022-01-01 PROCEDURE — P9040 RBC LEUKOREDUCED IRRADIATED: HCPCS | Performed by: NURSE PRACTITIONER

## 2022-01-01 PROCEDURE — 84145 PROCALCITONIN (PCT): CPT | Performed by: INTERNAL MEDICINE

## 2022-01-01 PROCEDURE — 99291 PR CRITICAL CARE, E/M 30-74 MINUTES: ICD-10-PCS | Mod: ,,, | Performed by: NURSE PRACTITIONER

## 2022-01-01 PROCEDURE — 63600175 PHARM REV CODE 636 W HCPCS: Performed by: INTERNAL MEDICINE

## 2022-01-01 PROCEDURE — 85610 PROTHROMBIN TIME: CPT | Performed by: INTERNAL MEDICINE

## 2022-01-01 PROCEDURE — 94660 CPAP INITIATION&MGMT: CPT

## 2022-01-01 PROCEDURE — 88275 CYTOGENETICS 100-300: CPT | Mod: 59 | Performed by: NURSE PRACTITIONER

## 2022-01-01 PROCEDURE — 99291 CRITICAL CARE FIRST HOUR: CPT | Mod: FS,,, | Performed by: NURSE PRACTITIONER

## 2022-01-01 PROCEDURE — 85097 PR  BONE MARROW,SMEAR INTERPRETATION: ICD-10-PCS | Mod: ,,, | Performed by: PATHOLOGY

## 2022-01-01 PROCEDURE — 36415 COLL VENOUS BLD VENIPUNCTURE: CPT | Performed by: NURSE PRACTITIONER

## 2022-01-01 PROCEDURE — 63600175 PHARM REV CODE 636 W HCPCS: Performed by: NURSE PRACTITIONER

## 2022-01-01 PROCEDURE — 99291 PR CRITICAL CARE, E/M 30-74 MINUTES: ICD-10-PCS | Mod: 25,,, | Performed by: NURSE PRACTITIONER

## 2022-01-01 PROCEDURE — 80053 COMPREHEN METABOLIC PANEL: CPT | Mod: 91 | Performed by: INTERNAL MEDICINE

## 2022-01-01 PROCEDURE — 85384 FIBRINOGEN ACTIVITY: CPT | Performed by: NURSE PRACTITIONER

## 2022-01-01 PROCEDURE — 20000000 HC ICU ROOM

## 2022-01-01 PROCEDURE — 25000242 PHARM REV CODE 250 ALT 637 W/ HCPCS

## 2022-01-01 PROCEDURE — 85610 PROTHROMBIN TIME: CPT | Performed by: NURSE PRACTITIONER

## 2022-01-01 PROCEDURE — 88189 FLOWCYTOMETRY/READ 16 & >: CPT | Mod: ,,, | Performed by: PATHOLOGY

## 2022-01-01 PROCEDURE — 85027 COMPLETE CBC AUTOMATED: CPT | Mod: 91 | Performed by: NURSE PRACTITIONER

## 2022-01-01 PROCEDURE — 85007 BL SMEAR W/DIFF WBC COUNT: CPT | Mod: 91 | Performed by: NURSE PRACTITIONER

## 2022-01-01 PROCEDURE — 83615 LACTATE (LD) (LDH) ENZYME: CPT

## 2022-01-01 PROCEDURE — 85610 PROTHROMBIN TIME: CPT

## 2022-01-01 PROCEDURE — 63700000 PHARM REV CODE 250 ALT 637 W/O HCPCS: Performed by: NURSE PRACTITIONER

## 2022-01-01 PROCEDURE — 84550 ASSAY OF BLOOD/URIC ACID: CPT

## 2022-01-01 PROCEDURE — 94640 AIRWAY INHALATION TREATMENT: CPT

## 2022-01-01 PROCEDURE — 27000221 HC OXYGEN, UP TO 24 HOURS

## 2022-01-01 PROCEDURE — 87633 RESP VIRUS 12-25 TARGETS: CPT | Performed by: INTERNAL MEDICINE

## 2022-01-01 PROCEDURE — 94761 N-INVAS EAR/PLS OXIMETRY MLT: CPT

## 2022-01-01 PROCEDURE — 99291 CRITICAL CARE FIRST HOUR: CPT | Mod: ,,, | Performed by: NURSE PRACTITIONER

## 2022-01-01 PROCEDURE — P9038 RBC IRRADIATED: HCPCS

## 2022-01-01 PROCEDURE — 82803 BLOOD GASES ANY COMBINATION: CPT

## 2022-01-01 PROCEDURE — 85027 COMPLETE CBC AUTOMATED: CPT

## 2022-01-01 PROCEDURE — 85379 FIBRIN DEGRADATION QUANT: CPT

## 2022-01-01 PROCEDURE — 85060 BLOOD SMEAR INTERPRETATION: CPT | Mod: ,,, | Performed by: PATHOLOGY

## 2022-01-01 PROCEDURE — 88264 CHROMOSOME ANALYSIS 20-25: CPT | Performed by: NURSE PRACTITIONER

## 2022-01-01 PROCEDURE — 99233 SBSQ HOSP IP/OBS HIGH 50: CPT | Mod: GC,,, | Performed by: INTERNAL MEDICINE

## 2022-01-01 PROCEDURE — 80053 COMPREHEN METABOLIC PANEL: CPT

## 2022-01-01 PROCEDURE — 99292 PR CRITICAL CARE, ADDL 30 MIN: ICD-10-PCS | Mod: 25,FS,, | Performed by: NURSE PRACTITIONER

## 2022-01-01 PROCEDURE — 85060 PATHOLOGIST REVIEW: ICD-10-PCS | Mod: ,,, | Performed by: PATHOLOGY

## 2022-01-01 PROCEDURE — 25000003 PHARM REV CODE 250: Performed by: INTERNAL MEDICINE

## 2022-01-01 PROCEDURE — 99900035 HC TECH TIME PER 15 MIN (STAT)

## 2022-01-01 PROCEDURE — 83605 ASSAY OF LACTIC ACID: CPT | Performed by: INTERNAL MEDICINE

## 2022-01-01 PROCEDURE — 99291 PR CRITICAL CARE, E/M 30-74 MINUTES: ICD-10-PCS | Mod: 25,FS,, | Performed by: NURSE PRACTITIONER

## 2022-01-01 PROCEDURE — 25000003 PHARM REV CODE 250: Performed by: STUDENT IN AN ORGANIZED HEALTH CARE EDUCATION/TRAINING PROGRAM

## 2022-01-01 PROCEDURE — 85730 THROMBOPLASTIN TIME PARTIAL: CPT | Performed by: NURSE PRACTITIONER

## 2022-01-01 PROCEDURE — 88305 TISSUE EXAM BY PATHOLOGIST: CPT | Performed by: PATHOLOGY

## 2022-01-01 PROCEDURE — 80074 ACUTE HEPATITIS PANEL: CPT | Performed by: INTERNAL MEDICINE

## 2022-01-01 PROCEDURE — 36556 INSERT NON-TUNNEL CV CATH: CPT | Mod: ,,, | Performed by: NURSE PRACTITIONER

## 2022-01-01 PROCEDURE — 36410 VNPNXR 3YR/> PHY/QHP DX/THER: CPT

## 2022-01-01 PROCEDURE — 25000003 PHARM REV CODE 250: Performed by: NURSE PRACTITIONER

## 2022-01-01 PROCEDURE — 85007 BL SMEAR W/DIFF WBC COUNT: CPT

## 2022-01-01 PROCEDURE — 85060 BLOOD SMEAR INTERPRETATION: CPT | Mod: 59,,, | Performed by: PATHOLOGY

## 2022-01-01 PROCEDURE — 99900026 HC AIRWAY MAINTENANCE (STAT)

## 2022-01-01 PROCEDURE — 88237 TISSUE CULTURE BONE MARROW: CPT | Performed by: NURSE PRACTITIONER

## 2022-01-01 PROCEDURE — 25000003 PHARM REV CODE 250

## 2022-01-01 PROCEDURE — 36556 PR INSERT NON-TUNNEL CV CATH 5+ YRS OLD: ICD-10-PCS | Mod: ,,, | Performed by: NURSE PRACTITIONER

## 2022-01-01 PROCEDURE — 81479 UNLISTED MOLECULAR PATHOLOGY: CPT | Performed by: NURSE PRACTITIONER

## 2022-01-01 PROCEDURE — 99223 PR INITIAL HOSPITAL CARE,LEVL III: ICD-10-PCS | Mod: AI,GC,, | Performed by: INTERNAL MEDICINE

## 2022-01-01 PROCEDURE — 86920 COMPATIBILITY TEST SPIN: CPT | Performed by: NURSE PRACTITIONER

## 2022-01-01 PROCEDURE — 87340 HEPATITIS B SURFACE AG IA: CPT | Performed by: NURSE PRACTITIONER

## 2022-01-01 PROCEDURE — P9037 PLATE PHERES LEUKOREDU IRRAD: HCPCS | Performed by: NURSE PRACTITIONER

## 2022-01-01 PROCEDURE — 86038 ANTINUCLEAR ANTIBODIES: CPT | Performed by: NURSE PRACTITIONER

## 2022-01-01 PROCEDURE — 36620 ARTERIAL LINE: ICD-10-PCS | Mod: 59,,, | Performed by: NURSE PRACTITIONER

## 2022-01-01 PROCEDURE — 99291 PR CRITICAL CARE, E/M 30-74 MINUTES: ICD-10-PCS | Mod: FS,,, | Performed by: NURSE PRACTITIONER

## 2022-01-01 PROCEDURE — 88185 FLOWCYTOMETRY/TC ADD-ON: CPT | Performed by: PATHOLOGY

## 2022-01-01 PROCEDURE — 80202 ASSAY OF VANCOMYCIN: CPT | Performed by: INTERNAL MEDICINE

## 2022-01-01 PROCEDURE — 84100 ASSAY OF PHOSPHORUS: CPT

## 2022-01-01 PROCEDURE — 83605 ASSAY OF LACTIC ACID: CPT | Mod: 91

## 2022-01-01 PROCEDURE — 85007 BL SMEAR W/DIFF WBC COUNT: CPT | Mod: 91 | Performed by: INTERNAL MEDICINE

## 2022-01-01 PROCEDURE — 88311 DECALCIFY TISSUE: CPT | Performed by: PATHOLOGY

## 2022-01-01 PROCEDURE — 99292 PR CRITICAL CARE, ADDL 30 MIN: ICD-10-PCS | Mod: FS,,, | Performed by: NURSE PRACTITIONER

## 2022-01-01 PROCEDURE — 36600 WITHDRAWAL OF ARTERIAL BLOOD: CPT

## 2022-01-01 PROCEDURE — 25000242 PHARM REV CODE 250 ALT 637 W/ HCPCS: Performed by: INTERNAL MEDICINE

## 2022-01-01 PROCEDURE — 36620 INSERTION CATHETER ARTERY: CPT

## 2022-01-01 PROCEDURE — 88311 DECALCIFY TISSUE: CPT | Mod: 26,,, | Performed by: PATHOLOGY

## 2022-01-01 PROCEDURE — 86901 BLOOD TYPING SEROLOGIC RH(D): CPT | Mod: 91 | Performed by: INTERNAL MEDICINE

## 2022-01-01 PROCEDURE — 76937 US GUIDE VASCULAR ACCESS: CPT

## 2022-01-01 PROCEDURE — 85027 COMPLETE CBC AUTOMATED: CPT | Mod: 91 | Performed by: INTERNAL MEDICINE

## 2022-01-01 PROCEDURE — 83735 ASSAY OF MAGNESIUM: CPT

## 2022-01-01 PROCEDURE — 83036 HEMOGLOBIN GLYCOSYLATED A1C: CPT

## 2022-01-01 PROCEDURE — 20600001 HC STEP DOWN PRIVATE ROOM

## 2022-01-01 PROCEDURE — 88305 TISSUE EXAM BY PATHOLOGIST: CPT | Mod: 26,,, | Performed by: PATHOLOGY

## 2022-01-01 PROCEDURE — 82728 ASSAY OF FERRITIN: CPT

## 2022-01-01 PROCEDURE — 38222 DX BONE MARROW BX & ASPIR: CPT | Mod: RT,,, | Performed by: NURSE PRACTITIONER

## 2022-01-01 PROCEDURE — 83615 LACTATE (LD) (LDH) ENZYME: CPT | Performed by: NURSE PRACTITIONER

## 2022-01-01 PROCEDURE — 99292 CRITICAL CARE ADDL 30 MIN: CPT | Mod: FS,,, | Performed by: NURSE PRACTITIONER

## 2022-01-01 PROCEDURE — 85007 BL SMEAR W/DIFF WBC COUNT: CPT | Performed by: INTERNAL MEDICINE

## 2022-01-01 PROCEDURE — 85097 BONE MARROW INTERPRETATION: CPT | Mod: ,,, | Performed by: PATHOLOGY

## 2022-01-01 PROCEDURE — 27100171 HC OXYGEN HIGH FLOW UP TO 24 HOURS

## 2022-01-01 PROCEDURE — 63600175 PHARM REV CODE 636 W HCPCS

## 2022-01-01 PROCEDURE — 99233 PR SUBSEQUENT HOSPITAL CARE,LEVL III: ICD-10-PCS | Mod: GC,,, | Performed by: INTERNAL MEDICINE

## 2022-01-01 PROCEDURE — 99233 PR SUBSEQUENT HOSPITAL CARE,LEVL III: ICD-10-PCS | Mod: ,,, | Performed by: INTERNAL MEDICINE

## 2022-01-01 PROCEDURE — 88311 PR  DECALCIFY TISSUE: ICD-10-PCS | Mod: 26,,, | Performed by: PATHOLOGY

## 2022-01-01 PROCEDURE — 93010 EKG 12-LEAD: ICD-10-PCS | Mod: ,,, | Performed by: INTERNAL MEDICINE

## 2022-01-01 PROCEDURE — 88189 PR  FLOWCYTOMETRY/READ, 16 & > MARKERS: ICD-10-PCS | Mod: ,,, | Performed by: PATHOLOGY

## 2022-01-01 PROCEDURE — 85384 FIBRINOGEN ACTIVITY: CPT

## 2022-01-01 PROCEDURE — 87040 BLOOD CULTURE FOR BACTERIA: CPT | Mod: 59

## 2022-01-01 PROCEDURE — 84484 ASSAY OF TROPONIN QUANT: CPT | Mod: 91 | Performed by: INTERNAL MEDICINE

## 2022-01-01 PROCEDURE — 92950 HEART/LUNG RESUSCITATION CPR: CPT

## 2022-01-01 PROCEDURE — 88299 UNLISTED CYTOGENETIC STUDY: CPT | Performed by: NURSE PRACTITIONER

## 2022-01-01 PROCEDURE — 86901 BLOOD TYPING SEROLOGIC RH(D): CPT | Performed by: STUDENT IN AN ORGANIZED HEALTH CARE EDUCATION/TRAINING PROGRAM

## 2022-01-01 PROCEDURE — 36430 TRANSFUSION BLD/BLD COMPNT: CPT

## 2022-01-01 PROCEDURE — 36620 INSERTION CATHETER ARTERY: CPT | Mod: 59,,, | Performed by: NURSE PRACTITIONER

## 2022-01-01 PROCEDURE — 83690 ASSAY OF LIPASE: CPT | Performed by: INTERNAL MEDICINE

## 2022-01-01 PROCEDURE — 83880 ASSAY OF NATRIURETIC PEPTIDE: CPT

## 2022-01-01 PROCEDURE — 84466 ASSAY OF TRANSFERRIN: CPT

## 2022-01-01 PROCEDURE — 86738 MYCOPLASMA ANTIBODY: CPT | Performed by: NURSE PRACTITIONER

## 2022-01-01 PROCEDURE — 88305 TISSUE EXAM BY PATHOLOGIST: ICD-10-PCS | Mod: 26,,, | Performed by: PATHOLOGY

## 2022-01-01 PROCEDURE — 38222 PR BONE MARROW BIOPSY(IES) W/ASPIRATION(S); DIAGNOSTIC: ICD-10-PCS | Mod: RT,,, | Performed by: NURSE PRACTITIONER

## 2022-01-01 PROCEDURE — 25500020 PHARM REV CODE 255: Performed by: INTERNAL MEDICINE

## 2022-01-01 PROCEDURE — 85027 COMPLETE CBC AUTOMATED: CPT | Performed by: NURSE PRACTITIONER

## 2022-01-01 PROCEDURE — 27000190 HC CPAP FULL FACE MASK W/VALVE

## 2022-01-01 PROCEDURE — 93010 ELECTROCARDIOGRAM REPORT: CPT | Mod: ,,, | Performed by: INTERNAL MEDICINE

## 2022-01-01 PROCEDURE — 86920 COMPATIBILITY TEST SPIN: CPT

## 2022-01-01 PROCEDURE — 31500 PR INSERT, EMERGENCY ENDOTRACH AIRWAY: ICD-10-PCS | Mod: GC,,, | Performed by: INTERNAL MEDICINE

## 2022-01-01 PROCEDURE — 31500 INSERT EMERGENCY AIRWAY: CPT | Mod: GC,,, | Performed by: INTERNAL MEDICINE

## 2022-01-01 PROCEDURE — 36415 COLL VENOUS BLD VENIPUNCTURE: CPT | Performed by: INTERNAL MEDICINE

## 2022-01-01 PROCEDURE — 87389 HIV-1 AG W/HIV-1&-2 AB AG IA: CPT | Performed by: NURSE PRACTITIONER

## 2022-01-01 PROCEDURE — 85730 THROMBOPLASTIN TIME PARTIAL: CPT

## 2022-01-01 PROCEDURE — 82010 KETONE BODYS QUAN: CPT | Performed by: INTERNAL MEDICINE

## 2022-01-01 PROCEDURE — 88313 SPECIAL STAINS GROUP 2: CPT | Performed by: PATHOLOGY

## 2022-01-01 PROCEDURE — 99223 1ST HOSP IP/OBS HIGH 75: CPT | Mod: AI,GC,, | Performed by: INTERNAL MEDICINE

## 2022-01-01 PROCEDURE — 83930 ASSAY OF BLOOD OSMOLALITY: CPT | Performed by: INTERNAL MEDICINE

## 2022-01-01 PROCEDURE — 85027 COMPLETE CBC AUTOMATED: CPT | Performed by: INTERNAL MEDICINE

## 2022-01-01 PROCEDURE — C1751 CATH, INF, PER/CENT/MIDLINE: HCPCS

## 2022-01-01 PROCEDURE — 27201040 HC RC 50 FILTER: Performed by: INTERNAL MEDICINE

## 2022-01-01 PROCEDURE — 83735 ASSAY OF MAGNESIUM: CPT | Mod: 91 | Performed by: INTERNAL MEDICINE

## 2022-01-01 PROCEDURE — 63600175 PHARM REV CODE 636 W HCPCS: Performed by: STUDENT IN AN ORGANIZED HEALTH CARE EDUCATION/TRAINING PROGRAM

## 2022-01-01 PROCEDURE — 88313 SPECIAL STAINS GROUP 2: CPT | Mod: 26,,, | Performed by: PATHOLOGY

## 2022-01-01 PROCEDURE — 84550 ASSAY OF BLOOD/URIC ACID: CPT | Performed by: NURSE PRACTITIONER

## 2022-01-01 PROCEDURE — 51702 INSERT TEMP BLADDER CATH: CPT

## 2022-01-01 PROCEDURE — 86850 RBC ANTIBODY SCREEN: CPT

## 2022-01-01 PROCEDURE — 84100 ASSAY OF PHOSPHORUS: CPT | Mod: 91 | Performed by: INTERNAL MEDICINE

## 2022-01-01 PROCEDURE — 80048 BASIC METABOLIC PNL TOTAL CA: CPT | Mod: XB | Performed by: INTERNAL MEDICINE

## 2022-01-01 PROCEDURE — 94002 VENT MGMT INPAT INIT DAY: CPT

## 2022-01-01 PROCEDURE — 81001 URINALYSIS AUTO W/SCOPE: CPT | Performed by: NURSE PRACTITIONER

## 2022-01-01 PROCEDURE — 93005 ELECTROCARDIOGRAM TRACING: CPT

## 2022-01-01 PROCEDURE — 99292 CRITICAL CARE ADDL 30 MIN: CPT | Mod: 25,FS,, | Performed by: NURSE PRACTITIONER

## 2022-01-01 PROCEDURE — 87040 BLOOD CULTURE FOR BACTERIA: CPT | Performed by: STUDENT IN AN ORGANIZED HEALTH CARE EDUCATION/TRAINING PROGRAM

## 2022-01-01 PROCEDURE — 37799 UNLISTED PX VASCULAR SURGERY: CPT

## 2022-01-01 PROCEDURE — 94003 VENT MGMT INPAT SUBQ DAY: CPT

## 2022-01-01 PROCEDURE — 84484 ASSAY OF TROPONIN QUANT: CPT | Performed by: INTERNAL MEDICINE

## 2022-01-01 PROCEDURE — 86704 HEP B CORE ANTIBODY TOTAL: CPT | Performed by: NURSE PRACTITIONER

## 2022-01-01 PROCEDURE — 99291 CRITICAL CARE FIRST HOUR: CPT | Mod: 25,,, | Performed by: NURSE PRACTITIONER

## 2022-01-01 PROCEDURE — 81310 NPM1 GENE: CPT | Performed by: NURSE PRACTITIONER

## 2022-01-01 PROCEDURE — 99291 CRITICAL CARE FIRST HOUR: CPT | Mod: 25,FS,, | Performed by: NURSE PRACTITIONER

## 2022-01-01 PROCEDURE — 81450 HL NEO GSAP 5-50DNA/DNA&RNA: CPT | Performed by: NURSE PRACTITIONER

## 2022-01-01 PROCEDURE — 88313 PR  SPECIAL STAINS,GROUP II: ICD-10-PCS | Mod: 26,,, | Performed by: PATHOLOGY

## 2022-01-01 PROCEDURE — 87086 URINE CULTURE/COLONY COUNT: CPT | Performed by: NURSE PRACTITIONER

## 2022-01-01 RX ORDER — ACYCLOVIR 200 MG/1
400 CAPSULE ORAL 2 TIMES DAILY
Status: DISCONTINUED | OUTPATIENT
Start: 2022-01-01 | End: 2022-01-01

## 2022-01-01 RX ORDER — FAMOTIDINE 20 MG/1
20 TABLET, FILM COATED ORAL 2 TIMES DAILY
Status: DISCONTINUED | OUTPATIENT
Start: 2022-01-01 | End: 2022-01-01

## 2022-01-01 RX ORDER — SODIUM BICARBONATE 1 MEQ/ML
SYRINGE (ML) INTRAVENOUS CODE/TRAUMA/SEDATION MEDICATION
Status: COMPLETED | OUTPATIENT
Start: 2022-01-01 | End: 2022-01-01

## 2022-01-01 RX ORDER — DIAZEPAM 10 MG/2ML
5 INJECTION INTRAMUSCULAR
Status: DISCONTINUED | OUTPATIENT
Start: 2022-01-01 | End: 2022-01-01

## 2022-01-01 RX ORDER — HYDROCODONE BITARTRATE AND ACETAMINOPHEN 500; 5 MG/1; MG/1
TABLET ORAL
Status: DISCONTINUED | OUTPATIENT
Start: 2022-01-01 | End: 2022-07-15 | Stop reason: HOSPADM

## 2022-01-01 RX ORDER — HYDROCODONE BITARTRATE AND ACETAMINOPHEN 500; 5 MG/1; MG/1
TABLET ORAL
Status: DISCONTINUED | OUTPATIENT
Start: 2022-01-01 | End: 2022-01-01

## 2022-01-01 RX ORDER — ACETAMINOPHEN 500 MG
1000 TABLET ORAL EVERY 8 HOURS PRN
Status: DISCONTINUED | OUTPATIENT
Start: 2022-01-01 | End: 2022-01-01

## 2022-01-01 RX ORDER — EPINEPHRINE 0.1 MG/ML
INJECTION INTRAVENOUS CODE/TRAUMA/SEDATION MEDICATION
Status: COMPLETED | OUTPATIENT
Start: 2022-01-01 | End: 2022-01-01

## 2022-01-01 RX ORDER — HYDROMORPHONE HYDROCHLORIDE 1 MG/ML
0.5 INJECTION, SOLUTION INTRAMUSCULAR; INTRAVENOUS; SUBCUTANEOUS ONCE
Status: COMPLETED | OUTPATIENT
Start: 2022-01-01 | End: 2022-01-01

## 2022-01-01 RX ORDER — MAG HYDROX/ALUMINUM HYD/SIMETH 200-200-20
30 SUSPENSION, ORAL (FINAL DOSE FORM) ORAL
Status: DISCONTINUED | OUTPATIENT
Start: 2022-01-01 | End: 2022-01-01

## 2022-01-01 RX ORDER — CEFEPIME HYDROCHLORIDE 1 G/50ML
2 INJECTION, SOLUTION INTRAVENOUS
Status: DISCONTINUED | OUTPATIENT
Start: 2022-01-01 | End: 2022-07-15 | Stop reason: HOSPADM

## 2022-01-01 RX ORDER — MAGNESIUM SULFATE HEPTAHYDRATE 40 MG/ML
2 INJECTION, SOLUTION INTRAVENOUS ONCE
Status: COMPLETED | OUTPATIENT
Start: 2022-01-01 | End: 2022-01-01

## 2022-01-01 RX ORDER — BUTALBITAL, ACETAMINOPHEN AND CAFFEINE 50; 325; 40 MG/1; MG/1; MG/1
1 TABLET ORAL EVERY 4 HOURS PRN
Status: ON HOLD | COMMUNITY
End: 2022-01-01

## 2022-01-01 RX ORDER — LIDOCAINE HYDROCHLORIDE 20 MG/ML
5 INJECTION, SOLUTION EPIDURAL; INFILTRATION; INTRACAUDAL; PERINEURAL ONCE
Status: DISCONTINUED | OUTPATIENT
Start: 2022-01-01 | End: 2022-01-01

## 2022-01-01 RX ORDER — HYDROCHLOROTHIAZIDE 25 MG/1
25 TABLET ORAL DAILY
Status: DISCONTINUED | OUTPATIENT
Start: 2022-01-01 | End: 2022-01-01

## 2022-01-01 RX ORDER — ROCURONIUM BROMIDE 10 MG/ML
100 INJECTION, SOLUTION INTRAVENOUS ONCE
Status: COMPLETED | OUTPATIENT
Start: 2022-01-01 | End: 2022-01-01

## 2022-01-01 RX ORDER — LIDOCAINE 50 MG/G
1 PATCH TOPICAL
Status: DISCONTINUED | OUTPATIENT
Start: 2022-01-01 | End: 2022-01-01

## 2022-01-01 RX ORDER — NOREPINEPHRINE BITARTRATE/D5W 4MG/250ML
0-.2 PLASTIC BAG, INJECTION (ML) INTRAVENOUS CONTINUOUS
Status: DISCONTINUED | OUTPATIENT
Start: 2022-01-01 | End: 2022-01-01

## 2022-01-01 RX ORDER — FUROSEMIDE 10 MG/ML
40 INJECTION INTRAMUSCULAR; INTRAVENOUS ONCE
Status: COMPLETED | OUTPATIENT
Start: 2022-01-01 | End: 2022-01-01

## 2022-01-01 RX ORDER — IPRATROPIUM BROMIDE AND ALBUTEROL SULFATE 2.5; .5 MG/3ML; MG/3ML
3 SOLUTION RESPIRATORY (INHALATION) EVERY 6 HOURS
Status: DISCONTINUED | OUTPATIENT
Start: 2022-01-01 | End: 2022-01-01

## 2022-01-01 RX ORDER — MIDAZOLAM HYDROCHLORIDE 5 MG/ML
1 INJECTION INTRAMUSCULAR; INTRAVENOUS ONCE
Status: DISCONTINUED | OUTPATIENT
Start: 2022-01-01 | End: 2022-01-01

## 2022-01-01 RX ORDER — PHENYLEPHRINE HCL IN 0.9% NACL 1 MG/10 ML
SYRINGE (ML) INTRAVENOUS
Status: COMPLETED
Start: 2022-01-01 | End: 2022-01-01

## 2022-01-01 RX ORDER — PROPOFOL 10 MG/ML
0-50 INJECTION, EMULSION INTRAVENOUS CONTINUOUS
Status: DISCONTINUED | OUTPATIENT
Start: 2022-01-01 | End: 2022-07-15 | Stop reason: HOSPADM

## 2022-01-01 RX ORDER — CEFEPIME HYDROCHLORIDE 1 G/50ML
2 INJECTION, SOLUTION INTRAVENOUS
Status: DISCONTINUED | OUTPATIENT
Start: 2022-01-01 | End: 2022-01-01

## 2022-01-01 RX ORDER — HYDROCHLOROTHIAZIDE 25 MG/1
25 TABLET ORAL DAILY
Status: ON HOLD | COMMUNITY
End: 2022-01-01

## 2022-01-01 RX ORDER — MIDAZOLAM HYDROCHLORIDE 1 MG/ML
1 INJECTION INTRAMUSCULAR; INTRAVENOUS ONCE
Status: COMPLETED | OUTPATIENT
Start: 2022-01-01 | End: 2022-01-01

## 2022-01-01 RX ORDER — AMITRIPTYLINE HYDROCHLORIDE 10 MG/1
10 TABLET, FILM COATED ORAL NIGHTLY PRN
Status: DISCONTINUED | OUTPATIENT
Start: 2022-01-01 | End: 2022-01-01

## 2022-01-01 RX ORDER — TALC
6 POWDER (GRAM) TOPICAL NIGHTLY PRN
Status: DISCONTINUED | OUTPATIENT
Start: 2022-01-01 | End: 2022-01-01

## 2022-01-01 RX ORDER — METOPROLOL SUCCINATE 50 MG/1
50 TABLET, EXTENDED RELEASE ORAL DAILY
Status: ON HOLD | COMMUNITY
End: 2022-01-01

## 2022-01-01 RX ORDER — INDOMETHACIN 25 MG/1
50 CAPSULE ORAL ONCE
Status: DISCONTINUED | OUTPATIENT
Start: 2022-01-01 | End: 2022-07-15 | Stop reason: HOSPADM

## 2022-01-01 RX ORDER — ONDANSETRON 2 MG/ML
8 INJECTION INTRAMUSCULAR; INTRAVENOUS EVERY 8 HOURS PRN
Status: DISCONTINUED | OUTPATIENT
Start: 2022-01-01 | End: 2022-07-15 | Stop reason: HOSPADM

## 2022-01-01 RX ORDER — ROCURONIUM BROMIDE 10 MG/ML
INJECTION, SOLUTION INTRAVENOUS
Status: COMPLETED
Start: 2022-01-01 | End: 2022-01-01

## 2022-01-01 RX ORDER — SODIUM CHLORIDE 0.9 % (FLUSH) 0.9 %
10 SYRINGE (ML) INJECTION EVERY 12 HOURS PRN
Status: DISCONTINUED | OUTPATIENT
Start: 2022-01-01 | End: 2022-07-15 | Stop reason: HOSPADM

## 2022-01-01 RX ORDER — ACETAMINOPHEN 325 MG/1
650 TABLET ORAL EVERY 6 HOURS PRN
Status: DISCONTINUED | OUTPATIENT
Start: 2022-01-01 | End: 2022-07-15 | Stop reason: HOSPADM

## 2022-01-01 RX ORDER — FLUCONAZOLE 100 MG/1
200 TABLET ORAL DAILY
Status: DISCONTINUED | OUTPATIENT
Start: 2022-01-01 | End: 2022-07-15 | Stop reason: HOSPADM

## 2022-01-01 RX ORDER — NALOXONE HCL 0.4 MG/ML
0.02 VIAL (ML) INJECTION
Status: DISCONTINUED | OUTPATIENT
Start: 2022-01-01 | End: 2022-07-15 | Stop reason: HOSPADM

## 2022-01-01 RX ORDER — PANTOPRAZOLE SODIUM 40 MG/1
40 TABLET, DELAYED RELEASE ORAL DAILY
Status: ON HOLD | COMMUNITY
End: 2022-01-01

## 2022-01-01 RX ORDER — GLUCAGON 1 MG
1 KIT INJECTION
Status: DISCONTINUED | OUTPATIENT
Start: 2022-01-01 | End: 2022-01-01

## 2022-01-01 RX ORDER — AMITRIPTYLINE HYDROCHLORIDE 10 MG/1
10 TABLET, FILM COATED ORAL NIGHTLY PRN
Status: ON HOLD | COMMUNITY
End: 2022-01-01

## 2022-01-01 RX ORDER — SUCCINYLCHOLINE CHLORIDE 20 MG/ML
INJECTION INTRAMUSCULAR; INTRAVENOUS
Status: COMPLETED
Start: 2022-01-01 | End: 2022-01-01

## 2022-01-01 RX ORDER — IPRATROPIUM BROMIDE AND ALBUTEROL SULFATE 2.5; .5 MG/3ML; MG/3ML
3 SOLUTION RESPIRATORY (INHALATION) EVERY 6 HOURS
Status: DISCONTINUED | OUTPATIENT
Start: 2022-01-01 | End: 2022-07-15 | Stop reason: HOSPADM

## 2022-01-01 RX ORDER — VASOPRESSIN 20 [USP'U]/ML
INJECTION, SOLUTION INTRAMUSCULAR; SUBCUTANEOUS
Status: DISCONTINUED
Start: 2022-01-01 | End: 2022-07-15 | Stop reason: HOSPADM

## 2022-01-01 RX ORDER — IBUPROFEN 200 MG
16 TABLET ORAL
Status: DISCONTINUED | OUTPATIENT
Start: 2022-01-01 | End: 2022-07-15 | Stop reason: HOSPADM

## 2022-01-01 RX ORDER — MIDAZOLAM HYDROCHLORIDE 1 MG/ML
1 INJECTION INTRAMUSCULAR; INTRAVENOUS EVERY 6 HOURS PRN
Status: DISCONTINUED | OUTPATIENT
Start: 2022-01-01 | End: 2022-01-01

## 2022-01-01 RX ORDER — PROPOFOL 10 MG/ML
VIAL (ML) INTRAVENOUS
Status: COMPLETED
Start: 2022-01-01 | End: 2022-01-01

## 2022-01-01 RX ORDER — ESCITALOPRAM OXALATE 10 MG/1
20 TABLET ORAL DAILY
Status: DISCONTINUED | OUTPATIENT
Start: 2022-01-01 | End: 2022-01-01

## 2022-01-01 RX ORDER — GLUCAGON 1 MG
1 KIT INJECTION
Status: DISCONTINUED | OUTPATIENT
Start: 2022-01-01 | End: 2022-07-15 | Stop reason: HOSPADM

## 2022-01-01 RX ORDER — ALLOPURINOL 100 MG/1
300 TABLET ORAL DAILY
Status: DISCONTINUED | OUTPATIENT
Start: 2022-01-01 | End: 2022-01-01

## 2022-01-01 RX ORDER — METOPROLOL SUCCINATE 50 MG/1
50 TABLET, EXTENDED RELEASE ORAL DAILY
Status: DISCONTINUED | OUTPATIENT
Start: 2022-01-01 | End: 2022-01-01

## 2022-01-01 RX ORDER — FUROSEMIDE 10 MG/ML
120 INJECTION INTRAMUSCULAR; INTRAVENOUS ONCE
Status: DISCONTINUED | OUTPATIENT
Start: 2022-01-01 | End: 2022-07-15 | Stop reason: HOSPADM

## 2022-01-01 RX ORDER — NOREPINEPHRINE BITARTRATE/D5W 4MG/250ML
PLASTIC BAG, INJECTION (ML) INTRAVENOUS
Status: DISCONTINUED
Start: 2022-01-01 | End: 2022-07-15 | Stop reason: HOSPADM

## 2022-01-01 RX ORDER — DIAZEPAM 10 MG/2ML
5 INJECTION INTRAMUSCULAR ONCE
Status: DISCONTINUED | OUTPATIENT
Start: 2022-01-01 | End: 2022-01-01

## 2022-01-01 RX ORDER — IPRATROPIUM BROMIDE AND ALBUTEROL SULFATE 2.5; .5 MG/3ML; MG/3ML
3 SOLUTION RESPIRATORY (INHALATION) ONCE
Status: COMPLETED | OUTPATIENT
Start: 2022-01-01 | End: 2022-01-01

## 2022-01-01 RX ORDER — VANCOMYCIN HCL IN 5 % DEXTROSE 1G/250ML
15 PLASTIC BAG, INJECTION (ML) INTRAVENOUS ONCE
Status: COMPLETED | OUTPATIENT
Start: 2022-01-01 | End: 2022-01-01

## 2022-01-01 RX ORDER — MUPIROCIN 20 MG/G
OINTMENT TOPICAL 2 TIMES DAILY
Status: DISCONTINUED | OUTPATIENT
Start: 2022-01-01 | End: 2022-07-15 | Stop reason: HOSPADM

## 2022-01-01 RX ORDER — METOPROLOL TARTRATE 1 MG/ML
5 INJECTION, SOLUTION INTRAVENOUS EVERY 5 MIN PRN
Status: DISCONTINUED | OUTPATIENT
Start: 2022-01-01 | End: 2022-07-15 | Stop reason: HOSPADM

## 2022-01-01 RX ORDER — NOREPINEPHRINE BITARTRATE/D5W 4MG/250ML
0-3 PLASTIC BAG, INJECTION (ML) INTRAVENOUS CONTINUOUS
Status: DISCONTINUED | OUTPATIENT
Start: 2022-01-01 | End: 2022-01-01

## 2022-01-01 RX ORDER — SIMETHICONE 80 MG
1 TABLET,CHEWABLE ORAL 4 TIMES DAILY PRN
Status: DISCONTINUED | OUTPATIENT
Start: 2022-01-01 | End: 2022-01-01

## 2022-01-01 RX ORDER — INDOMETHACIN 25 MG/1
CAPSULE ORAL
Status: DISCONTINUED
Start: 2022-01-01 | End: 2022-07-15 | Stop reason: WASHOUT

## 2022-01-01 RX ORDER — FLUCONAZOLE 100 MG/1
200 TABLET ORAL DAILY
Status: DISCONTINUED | OUTPATIENT
Start: 2022-01-01 | End: 2022-01-01

## 2022-01-01 RX ORDER — IBUPROFEN 200 MG
24 TABLET ORAL
Status: DISCONTINUED | OUTPATIENT
Start: 2022-01-01 | End: 2022-07-15 | Stop reason: HOSPADM

## 2022-01-01 RX ORDER — POTASSIUM CHLORIDE 750 MG/1
20 TABLET, EXTENDED RELEASE ORAL 2 TIMES DAILY
Status: ON HOLD | COMMUNITY
End: 2022-01-01

## 2022-01-01 RX ORDER — ATORVASTATIN CALCIUM 20 MG/1
20 TABLET, FILM COATED ORAL DAILY
Status: ON HOLD | COMMUNITY
End: 2022-01-01

## 2022-01-01 RX ORDER — METOPROLOL TARTRATE 1 MG/ML
5 INJECTION, SOLUTION INTRAVENOUS ONCE
Status: COMPLETED | OUTPATIENT
Start: 2022-01-01 | End: 2022-01-01

## 2022-01-01 RX ORDER — FLUDROCORTISONE ACETATE 0.1 MG/1
100 TABLET ORAL DAILY
Status: DISCONTINUED | OUTPATIENT
Start: 2022-01-01 | End: 2022-07-15 | Stop reason: HOSPADM

## 2022-01-01 RX ORDER — FAMOTIDINE 20 MG/1
20 TABLET, FILM COATED ORAL DAILY
Status: DISCONTINUED | OUTPATIENT
Start: 2022-01-01 | End: 2022-01-01

## 2022-01-01 RX ORDER — ATORVASTATIN CALCIUM 20 MG/1
20 TABLET, FILM COATED ORAL DAILY
Status: DISCONTINUED | OUTPATIENT
Start: 2022-01-01 | End: 2022-01-01

## 2022-01-01 RX ORDER — LORAZEPAM 2 MG/ML
1 INJECTION INTRAMUSCULAR EVERY 6 HOURS PRN
Status: DISCONTINUED | OUTPATIENT
Start: 2022-01-01 | End: 2022-01-01

## 2022-01-01 RX ORDER — ETOMIDATE 2 MG/ML
20 INJECTION INTRAVENOUS ONCE
Status: COMPLETED | OUTPATIENT
Start: 2022-01-01 | End: 2022-01-01

## 2022-01-01 RX ORDER — INSULIN ASPART 100 [IU]/ML
0-5 INJECTION, SOLUTION INTRAVENOUS; SUBCUTANEOUS EVERY 6 HOURS PRN
Status: DISCONTINUED | OUTPATIENT
Start: 2022-01-01 | End: 2022-07-15 | Stop reason: HOSPADM

## 2022-01-01 RX ORDER — ESCITALOPRAM OXALATE 20 MG/1
20 TABLET ORAL DAILY
Status: ON HOLD | COMMUNITY
End: 2022-01-01

## 2022-01-01 RX ORDER — ASPIRIN 81 MG/1
81 TABLET ORAL DAILY
Status: ON HOLD | COMMUNITY
End: 2022-01-01

## 2022-01-01 RX ORDER — ALLOPURINOL 100 MG/1
300 TABLET ORAL 2 TIMES DAILY
Status: DISCONTINUED | OUTPATIENT
Start: 2022-01-01 | End: 2022-07-15 | Stop reason: HOSPADM

## 2022-01-01 RX ORDER — ASPIRIN 81 MG/1
81 TABLET ORAL DAILY
Status: DISCONTINUED | OUTPATIENT
Start: 2022-01-01 | End: 2022-01-01

## 2022-01-01 RX ORDER — KETOROLAC TROMETHAMINE 10 MG/1
10 TABLET, FILM COATED ORAL EVERY 6 HOURS PRN
Status: DISCONTINUED | OUTPATIENT
Start: 2022-01-01 | End: 2022-01-01

## 2022-01-01 RX ORDER — FENTANYL CITRATE-0.9 % NACL/PF 10 MCG/ML
0-200 PLASTIC BAG, INJECTION (ML) INTRAVENOUS CONTINUOUS
Status: DISCONTINUED | OUTPATIENT
Start: 2022-01-01 | End: 2022-07-15 | Stop reason: HOSPADM

## 2022-01-01 RX ORDER — IPRATROPIUM BROMIDE AND ALBUTEROL SULFATE 2.5; .5 MG/3ML; MG/3ML
3 SOLUTION RESPIRATORY (INHALATION) EVERY 4 HOURS
Status: DISCONTINUED | OUTPATIENT
Start: 2022-01-01 | End: 2022-01-01

## 2022-01-01 RX ORDER — FUROSEMIDE 10 MG/ML
60 INJECTION INTRAMUSCULAR; INTRAVENOUS ONCE
Status: COMPLETED | OUTPATIENT
Start: 2022-01-01 | End: 2022-01-01

## 2022-01-01 RX ORDER — ACYCLOVIR 200 MG/1
400 CAPSULE ORAL 2 TIMES DAILY
Status: DISCONTINUED | OUTPATIENT
Start: 2022-01-01 | End: 2022-07-15 | Stop reason: HOSPADM

## 2022-01-01 RX ORDER — CHLORHEXIDINE GLUCONATE ORAL RINSE 1.2 MG/ML
15 SOLUTION DENTAL 2 TIMES DAILY
Status: DISCONTINUED | OUTPATIENT
Start: 2022-01-01 | End: 2022-01-01

## 2022-01-01 RX ORDER — AZITHROMYCIN 250 MG/1
500 TABLET, FILM COATED ORAL DAILY
Status: DISCONTINUED | OUTPATIENT
Start: 2022-01-01 | End: 2022-01-01

## 2022-01-01 RX ORDER — IPRATROPIUM BROMIDE AND ALBUTEROL SULFATE 2.5; .5 MG/3ML; MG/3ML
SOLUTION RESPIRATORY (INHALATION)
Status: COMPLETED
Start: 2022-01-01 | End: 2022-01-01

## 2022-01-01 RX ORDER — CALCIUM CHLORIDE INJECTION 100 MG/ML
INJECTION, SOLUTION INTRAVENOUS CODE/TRAUMA/SEDATION MEDICATION
Status: COMPLETED | OUTPATIENT
Start: 2022-01-01 | End: 2022-01-01

## 2022-01-01 RX ORDER — POLYETHYLENE GLYCOL 3350 17 G/17G
17 POWDER, FOR SOLUTION ORAL DAILY
Status: DISCONTINUED | OUTPATIENT
Start: 2022-01-01 | End: 2022-07-15 | Stop reason: HOSPADM

## 2022-01-01 RX ORDER — PROPOFOL 10 MG/ML
INJECTION, EMULSION INTRAVENOUS
Status: COMPLETED
Start: 2022-01-01 | End: 2022-01-01

## 2022-01-01 RX ORDER — ETOMIDATE 2 MG/ML
INJECTION INTRAVENOUS
Status: COMPLETED
Start: 2022-01-01 | End: 2022-01-01

## 2022-01-01 RX ORDER — CHOLECALCIFEROL (VITAMIN D3) 125 MCG
5000 CAPSULE ORAL DAILY
Status: ON HOLD | COMMUNITY
End: 2022-01-01

## 2022-01-01 RX ORDER — KETOROLAC TROMETHAMINE 15 MG/ML
15 INJECTION, SOLUTION INTRAMUSCULAR; INTRAVENOUS EVERY 6 HOURS PRN
Status: DISCONTINUED | OUTPATIENT
Start: 2022-01-01 | End: 2022-01-01

## 2022-01-01 RX ORDER — KETOROLAC TROMETHAMINE 10 MG/1
10 TABLET, FILM COATED ORAL EVERY 6 HOURS
Status: ON HOLD | COMMUNITY
End: 2022-01-01

## 2022-01-01 RX ORDER — FAMOTIDINE 20 MG/1
20 TABLET, FILM COATED ORAL DAILY
Status: DISCONTINUED | OUTPATIENT
Start: 2022-01-01 | End: 2022-07-15 | Stop reason: HOSPADM

## 2022-01-01 RX ORDER — SUCRALFATE 1 G/10ML
1 SUSPENSION ORAL EVERY 6 HOURS
Status: DISCONTINUED | OUTPATIENT
Start: 2022-01-01 | End: 2022-01-01

## 2022-01-01 RX ADMIN — IPRATROPIUM BROMIDE AND ALBUTEROL SULFATE 3 ML: 2.5; .5 SOLUTION RESPIRATORY (INHALATION) at 12:07

## 2022-01-01 RX ADMIN — IPRATROPIUM BROMIDE AND ALBUTEROL SULFATE 3 ML: 2.5; .5 SOLUTION RESPIRATORY (INHALATION) at 01:07

## 2022-01-01 RX ADMIN — DEXTROSE 125 ML: 10 SOLUTION INTRAVENOUS at 12:07

## 2022-01-01 RX ADMIN — CEFEPIME HYDROCHLORIDE 2 G: 2 INJECTION, SOLUTION INTRAVENOUS at 07:07

## 2022-01-01 RX ADMIN — PROPOFOL 1000 MG: 10 INJECTION, EMULSION INTRAVENOUS at 03:07

## 2022-01-01 RX ADMIN — IPRATROPIUM BROMIDE AND ALBUTEROL SULFATE 3 ML: 2.5; .5 SOLUTION RESPIRATORY (INHALATION) at 06:07

## 2022-01-01 RX ADMIN — METOPROLOL SUCCINATE 50 MG: 50 TABLET, EXTENDED RELEASE ORAL at 08:07

## 2022-01-01 RX ADMIN — IPRATROPIUM BROMIDE AND ALBUTEROL SULFATE 3 ML: 2.5; .5 SOLUTION RESPIRATORY (INHALATION) at 07:07

## 2022-01-01 RX ADMIN — EPINEPHRINE 1 MG: 0.1 INJECTION, SOLUTION ENDOTRACHEAL; INTRACARDIAC; INTRAVENOUS at 09:07

## 2022-01-01 RX ADMIN — NOREPINEPHRINE BITARTRATE 0.18 MCG/KG/MIN: 4 INJECTION, SOLUTION INTRAVENOUS at 03:07

## 2022-01-01 RX ADMIN — MUPIROCIN: 20 OINTMENT TOPICAL at 08:07

## 2022-01-01 RX ADMIN — ESCITALOPRAM OXALATE 20 MG: 10 TABLET ORAL at 08:07

## 2022-01-01 RX ADMIN — SODIUM CHLORIDE, SODIUM LACTATE, POTASSIUM CHLORIDE, AND CALCIUM CHLORIDE 750 ML: .6; .31; .03; .02 INJECTION, SOLUTION INTRAVENOUS at 05:07

## 2022-01-01 RX ADMIN — FAMOTIDINE 20 MG: 20 TABLET ORAL at 08:07

## 2022-01-01 RX ADMIN — HUMAN ALBUMIN MICROSPHERES AND PERFLUTREN 0.66 MG: 10; .22 INJECTION, SOLUTION INTRAVENOUS at 03:07

## 2022-01-01 RX ADMIN — CHLORHEXIDINE GLUCONATE 0.12% ORAL RINSE 15 ML: 1.2 LIQUID ORAL at 08:07

## 2022-01-01 RX ADMIN — ACETAMINOPHEN 650 MG: 325 TABLET ORAL at 08:07

## 2022-01-01 RX ADMIN — SODIUM CHLORIDE, SODIUM LACTATE, POTASSIUM CHLORIDE, AND CALCIUM CHLORIDE 250 ML: .6; .31; .03; .02 INJECTION, SOLUTION INTRAVENOUS at 04:07

## 2022-01-01 RX ADMIN — HYDROMORPHONE HYDROCHLORIDE 0.5 MG: 1 INJECTION, SOLUTION INTRAMUSCULAR; INTRAVENOUS; SUBCUTANEOUS at 03:07

## 2022-01-01 RX ADMIN — PROPOFOL 30 MCG/KG/MIN: 10 INJECTION, EMULSION INTRAVENOUS at 02:07

## 2022-01-01 RX ADMIN — MIDAZOLAM 1 MG: 1 INJECTION INTRAMUSCULAR; INTRAVENOUS at 11:07

## 2022-01-01 RX ADMIN — AZITHROMYCIN MONOHYDRATE 500 MG: 500 INJECTION, POWDER, LYOPHILIZED, FOR SOLUTION INTRAVENOUS at 11:07

## 2022-01-01 RX ADMIN — MUPIROCIN: 20 OINTMENT TOPICAL at 09:07

## 2022-01-01 RX ADMIN — CEFEPIME 2 G: 2 INJECTION, POWDER, FOR SOLUTION INTRAVENOUS at 02:07

## 2022-01-01 RX ADMIN — ALLOPURINOL 300 MG: 100 TABLET ORAL at 08:07

## 2022-01-01 RX ADMIN — CEFEPIME 2 G: 2 INJECTION, POWDER, FOR SOLUTION INTRAVENOUS at 01:07

## 2022-01-01 RX ADMIN — FUROSEMIDE 60 MG: 10 INJECTION, SOLUTION INTRAMUSCULAR; INTRAVENOUS at 06:07

## 2022-01-01 RX ADMIN — FLUCONAZOLE 200 MG: 100 TABLET ORAL at 10:07

## 2022-01-01 RX ADMIN — MIDAZOLAM 1 MG: 1 INJECTION INTRAMUSCULAR; INTRAVENOUS at 12:07

## 2022-01-01 RX ADMIN — CEFTRIAXONE 1 G: 1 INJECTION, SOLUTION INTRAVENOUS at 06:07

## 2022-01-01 RX ADMIN — CEFEPIME 2 G: 2 INJECTION, POWDER, FOR SOLUTION INTRAVENOUS at 03:07

## 2022-01-01 RX ADMIN — SUCRALFATE 1 G: 1 SUSPENSION ORAL at 05:07

## 2022-01-01 RX ADMIN — LIDOCAINE 1 PATCH: 50 PATCH CUTANEOUS at 03:07

## 2022-01-01 RX ADMIN — IPRATROPIUM BROMIDE AND ALBUTEROL SULFATE 3 ML: 2.5; .5 SOLUTION RESPIRATORY (INHALATION) at 11:07

## 2022-01-01 RX ADMIN — METOROPROLOL TARTRATE 5 MG: 5 INJECTION, SOLUTION INTRAVENOUS at 08:07

## 2022-01-01 RX ADMIN — KETOROLAC TROMETHAMINE 10 MG: 10 TABLET, FILM COATED ORAL at 05:07

## 2022-01-01 RX ADMIN — MAGNESIUM SULFATE HEPTAHYDRATE 2 G: 40 INJECTION, SOLUTION INTRAVENOUS at 09:07

## 2022-01-01 RX ADMIN — VANCOMYCIN HYDROCHLORIDE 1750 MG: 500 INJECTION, POWDER, LYOPHILIZED, FOR SOLUTION INTRAVENOUS at 02:07

## 2022-01-01 RX ADMIN — LIDOCAINE 1 PATCH: 50 PATCH CUTANEOUS at 02:07

## 2022-01-01 RX ADMIN — VANCOMYCIN HYDROCHLORIDE 1000 MG: 1 INJECTION, POWDER, LYOPHILIZED, FOR SOLUTION INTRAVENOUS at 10:07

## 2022-01-01 RX ADMIN — NOREPINEPHRINE BITARTRATE 0.4 MCG/KG/MIN: 4 INJECTION, SOLUTION INTRAVENOUS at 07:07

## 2022-01-01 RX ADMIN — SODIUM CHLORIDE, SODIUM LACTATE, POTASSIUM CHLORIDE, AND CALCIUM CHLORIDE 250 ML: .6; .31; .03; .02 INJECTION, SOLUTION INTRAVENOUS at 05:07

## 2022-01-01 RX ADMIN — ROCURONIUM BROMIDE 100 MG: 10 INJECTION INTRAVENOUS at 03:07

## 2022-01-01 RX ADMIN — POLYETHYLENE GLYCOL 3350 17 G: 17 POWDER, FOR SOLUTION ORAL at 10:07

## 2022-01-01 RX ADMIN — SODIUM CHLORIDE, SODIUM LACTATE, POTASSIUM CHLORIDE, AND CALCIUM CHLORIDE 250 ML: .6; .31; .03; .02 INJECTION, SOLUTION INTRAVENOUS at 12:07

## 2022-01-01 RX ADMIN — HYDROMORPHONE HYDROCHLORIDE 0.5 MG: 1 INJECTION, SOLUTION INTRAMUSCULAR; INTRAVENOUS; SUBCUTANEOUS at 10:07

## 2022-01-01 RX ADMIN — ACYCLOVIR 400 MG: 200 CAPSULE ORAL at 09:07

## 2022-01-01 RX ADMIN — CEFEPIME HYDROCHLORIDE 2 G: 2 INJECTION, SOLUTION INTRAVENOUS at 05:07

## 2022-01-01 RX ADMIN — SODIUM CHLORIDE 500 ML: 0.9 INJECTION, SOLUTION INTRAVENOUS at 01:07

## 2022-01-01 RX ADMIN — IPRATROPIUM BROMIDE AND ALBUTEROL SULFATE 3 ML: 2.5; .5 SOLUTION RESPIRATORY (INHALATION) at 09:07

## 2022-01-01 RX ADMIN — ETOMIDATE 20 MG: 2 INJECTION INTRAVENOUS at 03:07

## 2022-01-01 RX ADMIN — KETOROLAC TROMETHAMINE 15 MG: 15 INJECTION, SOLUTION INTRAMUSCULAR; INTRAVENOUS at 09:07

## 2022-01-01 RX ADMIN — ACYCLOVIR 400 MG: 200 CAPSULE ORAL at 08:07

## 2022-01-01 RX ADMIN — SODIUM CHLORIDE, SODIUM LACTATE, POTASSIUM CHLORIDE, AND CALCIUM CHLORIDE 250 ML: .6; .31; .03; .02 INJECTION, SOLUTION INTRAVENOUS at 01:07

## 2022-01-01 RX ADMIN — VANCOMYCIN HYDROCHLORIDE 1500 MG: 1.5 INJECTION, POWDER, LYOPHILIZED, FOR SOLUTION INTRAVENOUS at 03:07

## 2022-01-01 RX ADMIN — LIDOCAINE 1 PATCH: 50 PATCH CUTANEOUS at 05:07

## 2022-01-01 RX ADMIN — CEFEPIME HYDROCHLORIDE 2 G: 2 INJECTION, SOLUTION INTRAVENOUS at 10:07

## 2022-01-01 RX ADMIN — PROPOFOL 30 MCG/KG/MIN: 10 INJECTION, EMULSION INTRAVENOUS at 07:07

## 2022-01-01 RX ADMIN — NOREPINEPHRINE BITARTRATE 0.5 MCG/KG/MIN: 1 INJECTION, SOLUTION, CONCENTRATE INTRAVENOUS at 08:07

## 2022-01-01 RX ADMIN — CALCIUM CHLORIDE 1 G: 100 INJECTION, SOLUTION INTRAVENOUS at 09:07

## 2022-01-01 RX ADMIN — PROPOFOL 25 MCG/KG/MIN: 10 INJECTION, EMULSION INTRAVENOUS at 08:07

## 2022-01-01 RX ADMIN — FUROSEMIDE 40 MG: 10 INJECTION, SOLUTION INTRAMUSCULAR; INTRAVENOUS at 12:07

## 2022-01-01 RX ADMIN — FENTANYL CITRATE 25 MCG/HR: 50 INJECTION INTRAVENOUS at 03:07

## 2022-01-01 RX ADMIN — NOREPINEPHRINE BITARTRATE 3 MCG/KG/MIN: 1 INJECTION, SOLUTION, CONCENTRATE INTRAVENOUS at 09:07

## 2022-01-01 RX ADMIN — IPRATROPIUM BROMIDE AND ALBUTEROL SULFATE 3 ML: 2.5; .5 SOLUTION RESPIRATORY (INHALATION) at 04:07

## 2022-01-01 RX ADMIN — ROCURONIUM BROMIDE 100 MG: 10 INJECTION, SOLUTION INTRAVENOUS at 03:07

## 2022-01-01 RX ADMIN — ONDANSETRON 8 MG: 2 INJECTION INTRAMUSCULAR; INTRAVENOUS at 12:07

## 2022-01-01 RX ADMIN — FENTANYL CITRATE 150 MCG/HR: 50 INJECTION INTRAVENOUS at 04:07

## 2022-01-01 RX ADMIN — CEFEPIME HYDROCHLORIDE 2 G: 2 INJECTION, SOLUTION INTRAVENOUS at 03:07

## 2022-01-01 RX ADMIN — SIMETHICONE 80 MG: 80 TABLET, CHEWABLE ORAL at 11:07

## 2022-01-01 RX ADMIN — ACYCLOVIR 400 MG: 200 CAPSULE ORAL at 11:07

## 2022-01-01 RX ADMIN — NOREPINEPHRINE BITARTRATE 0.04 MCG/KG/MIN: 4 INJECTION, SOLUTION INTRAVENOUS at 11:07

## 2022-01-01 RX ADMIN — SODIUM BICARBONATE 50 MEQ: 84 INJECTION, SOLUTION INTRAVENOUS at 09:07

## 2022-01-01 RX ADMIN — HYDROCORTISONE SODIUM SUCCINATE 100 MG: 100 INJECTION, POWDER, FOR SOLUTION INTRAMUSCULAR; INTRAVENOUS at 06:07

## 2022-01-01 RX ADMIN — DEXTROSE MONOHYDRATE 25 G: 25 INJECTION, SOLUTION INTRAVENOUS at 09:07

## 2022-01-01 RX ADMIN — ONDANSETRON 8 MG: 2 INJECTION INTRAMUSCULAR; INTRAVENOUS at 08:07

## 2022-01-01 RX ADMIN — ONDANSETRON 8 MG: 2 INJECTION INTRAMUSCULAR; INTRAVENOUS at 09:07

## 2022-01-01 RX ADMIN — ACETAMINOPHEN 1000 MG: 500 TABLET ORAL at 01:07

## 2022-01-01 RX ADMIN — SODIUM CHLORIDE: 9 INJECTION, SOLUTION INTRAVENOUS at 03:07

## 2022-01-01 RX ADMIN — IOHEXOL 100 ML: 350 INJECTION, SOLUTION INTRAVENOUS at 06:07

## 2022-01-01 RX ADMIN — FLUDROCORTISONE ACETATE 100 MCG: 0.1 TABLET ORAL at 06:07

## 2022-07-11 PROBLEM — E78.5 HLD (HYPERLIPIDEMIA): Status: ACTIVE | Noted: 2022-01-01

## 2022-07-11 PROBLEM — G47.33 OSA ON CPAP: Status: ACTIVE | Noted: 2022-01-01

## 2022-07-11 PROBLEM — I10 HTN (HYPERTENSION): Status: ACTIVE | Noted: 2022-01-01

## 2022-07-11 PROBLEM — I25.10 CAD S/P PERCUTANEOUS CORONARY ANGIOPLASTY: Status: ACTIVE | Noted: 2022-01-01

## 2022-07-11 PROBLEM — J96.01 ACUTE HYPOXEMIC RESPIRATORY FAILURE: Status: ACTIVE | Noted: 2022-01-01

## 2022-07-11 PROBLEM — D64.9 ANEMIA: Status: ACTIVE | Noted: 2022-01-01

## 2022-07-11 PROBLEM — Z98.61 CAD S/P PERCUTANEOUS CORONARY ANGIOPLASTY: Status: ACTIVE | Noted: 2022-01-01

## 2022-07-11 PROBLEM — A41.9 SEVERE SEPSIS: Status: ACTIVE | Noted: 2022-01-01

## 2022-07-11 PROBLEM — D69.6 THROMBOCYTOPENIA: Status: ACTIVE | Noted: 2022-01-01

## 2022-07-11 PROBLEM — R65.20 SEVERE SEPSIS: Status: ACTIVE | Noted: 2022-01-01

## 2022-07-11 PROBLEM — K21.9 GERD (GASTROESOPHAGEAL REFLUX DISEASE): Status: ACTIVE | Noted: 2022-01-01

## 2022-07-11 NOTE — ASSESSMENT & PLAN NOTE
Patient with new complaints of progressive feelings of malaise, rhinorrhea, epistaxis, pleuretic chest pain / chest tightness, subjective fevers, night sweats over the course of approximately 10 days. Lungs with slight rales.Labs remarkable for thrombocytopenia 13, anemia Hgb 9.9, elevated lactate 2.29 > 2.46, elevated , fibrinogen 560, D-dimer 7650. RSV, Influenza, COVID negative. CXR demonstrated no acute cardiopulmonary process / focal consolidation. OSH CTA chest ordered for concerns for PE given elevated dimer and dyspnea, negative for PE.    Concerns for acute leukemia +/- concurrent infection CAP vs viral pulmonary process given reported upper respiratory symptoms, overall HPI course, and subjective fevers. Less consideration for component of pulmonary congestion given slight rales on exam at bibasilar fields and trace to 1+ edema in ankles/shins, though patient reports these findings are chronic and CXR negative.    - Patient on 4 L NC on arrival, SPO2 mid 90s  - Continuous pulse oximetry  - CXR repeat (OSH CXR did not note focal consolidation)  - CAP coverage: CTX 1 g qd x 5 day course; azithromycin 500 mg qd x 3 day course (given length of symptoms, progressive respiratory symptoms, consideration for CAP vs viral PNA).  - BCx x 2 (first set drawn at OSH)  - Fever trend; notify MD if T > 101  - Trend lacate (per chart review from OSH, lactate elevated to 2.2 > 2.46)  - IVF bolus 500 cc x 1 over 4 hours; monitor volume status / resp status closely.

## 2022-07-11 NOTE — SUBJECTIVE & OBJECTIVE
Past Medical History:   Diagnosis Date    Coronary artery disease     Encounter for blood transfusion     Hypertension        Past Surgical History:   Procedure Laterality Date    CHOLECYSTECTOMY      CORONARY ANGIOPLASTY WITH STENT PLACEMENT      HYSTERECTOMY         Review of patient's allergies indicates:   Allergen Reactions    Codeine     Guaifenesin     Opioids - morphine analogues     Zanaflex [tizanidine]        Family History       Problem Relation (Age of Onset)    Marisoluerysm Paternal Grandmother    Cancer Mother, Father, Brother    Heart disease Father, Son, Paternal Grandfather    Hyperlipidemia Mother, Father    Hypertension Mother, Father, Son, Paternal Grandmother, Paternal Grandfather          Tobacco Use    Smoking status: Never Smoker    Smokeless tobacco: Never Used   Substance and Sexual Activity    Alcohol use: Not Currently    Drug use: Not Currently    Sexual activity: Not Currently      Review of Systems   Unable to perform ROS: Acuity of condition   Constitutional:  Positive for fatigue. Negative for chills and fever.   Respiratory:  Positive for shortness of breath and wheezing.    Cardiovascular:  Positive for chest pain.   Gastrointestinal:  Negative for abdominal pain, diarrhea, nausea and vomiting.   Genitourinary:  Negative for dysuria.   Musculoskeletal:  Positive for back pain.   Neurological:  Negative for weakness, light-headedness and headaches.   Objective:     Vital Signs (Most Recent):  Temp: 98.5 °F (36.9 °C) (07/11/22 1000)  Pulse: (!) 118 (07/11/22 1226)  Resp: (!) 40 (07/11/22 1226)  BP: 109/76 (07/11/22 1100)  SpO2: 99 % (07/11/22 1226)   Vital Signs (24h Range):  Temp:  [97.8 °F (36.6 °C)-100 °F (37.8 °C)] 98.5 °F (36.9 °C)  Pulse:  [] 118  Resp:  [18-43] 40  SpO2:  [89 %-99 %] 99 %  BP: (109-173)/() 109/76   Weight: 96.4 kg (212 lb 8.4 oz)  Body mass index is 40.16 kg/m².      Intake/Output Summary (Last 24 hours) at 7/11/2022 1356  Last data filed at 7/11/2022  1200  Gross per 24 hour   Intake 599.47 ml   Output 130 ml   Net 469.47 ml       Physical Exam  Constitutional:       General: She is awake. She is in acute distress.      Appearance: She is ill-appearing.   HENT:      Head: Normocephalic and atraumatic.   Cardiovascular:      Rate and Rhythm: Tachycardia present.      Heart sounds: Murmur heard.   Pulmonary:      Effort: Tachypnea, accessory muscle usage and respiratory distress present.      Breath sounds: Wheezing present. No decreased breath sounds, rhonchi or rales.   Abdominal:      General: Abdomen is protuberant.      Palpations: Abdomen is soft.      Tenderness: There is no abdominal tenderness. There is no guarding or rebound.   Musculoskeletal:      Cervical back: Neck supple.   Neurological:      Mental Status: She is alert.      GCS: GCS eye subscore is 4. GCS verbal subscore is 5. GCS motor subscore is 6.      Comments: Unable to complete sentences due to dyspnea       Vents:  Oxygen Concentration (%): 50 (07/11/22 1226)  Lines/Drains/Airways       Drain  Duration                  Urethral Catheter 07/11/22 1147 <1 day                  Significant Labs:    CBC/Anemia Profile:  Recent Labs   Lab 07/11/22  0624 07/11/22  0625 07/11/22  1033 07/11/22  1311   WBC  --  17.32* 18.24*  --    HGB  --  9.0* 8.7*  --    HCT  --  26.9* 25.6* 26*   PLT  --  39* 41*  --    MCV  --  100* 97  --    RDW  --  17.4* 17.2*  --    IRON 75  --   --   --    FERRITIN 2,795*  --   --   --         Chemistries:  Recent Labs   Lab 07/11/22  0624 07/11/22  1033   * 129*   K 4.2 3.6   CL 98 95   CO2 17* 15*   BUN 16 22   CREATININE 0.8 1.2   CALCIUM 8.6* 8.4*   ALBUMIN 3.1* 3.0*   PROT 6.9 6.5   BILITOT 0.5 0.5   ALKPHOS 87 85   ALT 20 35   AST 25 37   MG 1.6 1.6   PHOS 3.0 2.7       All pertinent labs within the past 24 hours have been reviewed.    Significant Imaging: I have reviewed all pertinent imaging results/findings within the past 24 hours.

## 2022-07-11 NOTE — HPI
This is a 68 y/o F PMhx CAD, GERD, HTN, KIRBY w/ CPAP, adenomatous polyp of transverse colon 2018, extensive cancer history in father (brain), mother (bladder), brother (lymphoma) - all  from cancer. Patient presented to OSH due to progressive feelings of malaise, rhinorrhea, epistaxis, pleuretic chest pain / chest tightness, subjective fevers, night sweats over the course of approximately 10 days. Patient had visited walk-in clinic 3 days prior to OSH presentation, left with Rx Augmentin. At that time influenza and COVID tests were negative. Patient continued to have aforementioned symptoms despite antibiotic initiation, and with increasing subjective fevers. She then presented to [facility] for further evaluation as she began to experience symptoms of dyspnea. Patient was mildly hypertensive and afebrile (T max 100.2 per report) on arrival to OSH. Patient dyspneic on arrival, requiring 2-4 L NC to maintain SpO2 mid 90s. Labs remarkable for thrombocytopenia 13, anemia 9.9, elevated lactate 2.29 > 2.46, elevated , fibrinogen 560, D-dimer 7650. RSV, Influenza, COVID negative. CXR demonstrated no acute cardiopulmonary process. CTA chest ordered for concerns for PE given elevated dimer and dyspnea, negative for PE. Heme-onc evaluated, c/f acute leukemia given elevated d-dimer, peripheral smear demonstrating mononuclear cells, [place] [facility] [place]. Of note, no palpable LN / weight loss.

## 2022-07-11 NOTE — PROGRESS NOTES
Pharmacokinetic Initial Assessment: IV Vancomycin    Assessment/Plan:    Initiate intravenous vancomycin with loading dose of 1750 mg once with subsequent doses when random concentrations are less than 20 mcg/mL  Desired empiric serum trough concentration is 10 to 20 mcg/mL  Draw vancomycin random level on 7/12 at 1400.  Pharmacy will continue to follow and monitor vancomycin.      Please contact pharmacy at extension [phone] with any questions regarding this assessment.     Thank you for the consult,   Mayda Ryanne       Patient brief summary:  Kimmy Bass is a 67 y.o. female initiated on antimicrobial therapy with IV Vancomycin for treatment of suspected bacteremia    Drug Allergies:   Review of patient's allergies indicates:   Allergen Reactions    Codeine     Guaifenesin     Opioids - morphine analogues     Zanaflex [tizanidine]        Actual Body Weight:   96.4 kg    Renal Function:   Estimated Creatinine Clearance: 47.6 mL/min (based on SCr of 1.2 mg/dL).,     Dialysis Method (if applicable):  N/A    CBC (last 72 hours):  Recent Labs   Lab Result Units 07/11/22  0624 07/11/22  0625 07/11/22  1033   WBC K/uL  --  17.32* 18.24*   Hemoglobin g/dL  --  9.0* 8.7*   Hemoglobin A1C % 6.4*  --   --    Hematocrit %  --  26.9* 25.6*   Platelets K/uL  --  39* 41*   Gran % %  --  45.0 58.0   Lymph % %  --  19.0 16.0*   Mono % %  --  11.0 4.0   Eosinophil % %  --  0.0 0.0   Basophil % %  --  0.0 0.0   Differential Method   --  Manual Manual       Metabolic Panel (last 72 hours):  Recent Labs   Lab Result Units 07/11/22  0624 07/11/22  0953 07/11/22  1033   Sodium mmol/L 132*  --  129*   Potassium mmol/L 4.2  --  3.6   Chloride mmol/L 98  --  95   CO2 mmol/L 17*  --  15*   Glucose mg/dL 159*  --  175*   Glucose, UA   --  1+*  --    BUN mg/dL 16  --  22   Creatinine mg/dL 0.8  --  1.2   Albumin g/dL 3.1*  --  3.0*   Total Bilirubin mg/dL 0.5  --  0.5   Alkaline Phosphatase U/L 87  --  85   AST U/L 25  --  37   ALT U/L 20   --  35   Magnesium mg/dL 1.6  --  1.6   Phosphorus mg/dL 3.0  --  2.7       Drug levels (last 3 results):  No results for input(s): VANCOMYCINRA, VANCORANDOM, VANCOMYCINPE, VANCOPEAK, VANCOMYCINTR, VANCOTROUGH in the last 72 hours.    Microbiologic Results:  Microbiology Results (last 7 days)     Procedure Component Value Units Date/Time    Respiratory Infection Panel (PCR), Nasopharyngeal [[id number]] Collected: 07/11/22 1210    Order Status: Completed Specimen: Nasopharyngeal Swab Updated: 07/11/22 1216     Respiratory Infection Panel Source NP Swab    Narrative:      For all other respiratory sources, order [id number] -  Respiratory Viral Panel by PCR    Urine Culture High Risk [[id number]] Collected: 07/11/22 0953    Order Status: Sent Specimen: Urine, Catheterized Updated: 07/11/22 1000    Blood culture [[id number]] Collected: 07/11/22 0624    Order Status: Sent Specimen: Blood Updated: 07/11/22 0635    Blood culture [[id number]] Collected: 07/11/22 0624    Order Status: Sent Specimen: Blood Updated: 07/11/22 0635

## 2022-07-11 NOTE — H&P
[facility] - Oncology ([facility])  Hematology  Bone Marrow Transplant  H&P    Subjective:     Principal Problem: Acute hypoxemic respiratory failure    HPI: 68 y/o F PMhx CAD, GERD, HTN, KIRBY w/ CPAP, adenomatous polyp of transverse colon 2018, extensive cancer history in father (brain), mother (bladder), brother (lymphoma) - all  from cancer. Patient presented to OSH due to progressive feelings of malaise, rhinorrhea, epistaxis, pleuretic chest pain / chest tightness, subjective fevers, night sweats over the course of approximately 10 days. Patient had visited walk-in clinic 3 days prior to OSH presentation, left with Rx Augmentin. At that time influenza and COVID tests were negative. Patient continued to have aforementioned symptoms despite antibiotic initiation, and with increasing subjective fevers. She then presented to [facility] for further evaluation as she began to experience symptoms of dyspnea. Patient was mildly hypertensive and afebrile (T max 100.2 per report) on arrival to OSH. Patient dyspneic on arrival, requiring 2-4 L NC to maintain SpO2 mid 90s. Labs remarkable for thrombocytopenia 13, anemia 9.9, elevated lactate 2.29 > 2.46, elevated , fibrinogen 560, D-dimer 7650. RSV, Influenza, COVID negative. CXR demonstrated no acute cardiopulmonary process. CTA chest ordered for concerns for PE given elevated dimer and dyspnea, negative for PE. Heme-onc evaluated, c/f acute leukemia given elevated d-dimer, peripheral smear demonstrating mononuclear cells, [place] [facility] [place]. Of note, no palpable LN / weight loss.    Admitted to BMT service for further evaluation of possible new acute leukemia diagnosis.      Patient information was obtained from patient, parent, past medical records, and ER records.     Oncology History: N/A No personal oncologic history of note.     Medications Prior to Admission   Medication Sig Dispense Refill Last Dose    amitriptyline (ELAVIL)  10 MG tablet Take 10 mg by mouth nightly as needed for Insomnia.   Past Week at Unknown time    aspirin (ECOTRIN) 81 MG EC tablet Take 81 mg by mouth once daily.   Past Week at Unknown time    atorvastatin (LIPITOR) 20 MG tablet Take 20 mg by mouth once daily.   Past Week at Unknown time    biotin 5,000 mcg TbDL Take 5,000 mcg by mouth Daily.   Past Week at Unknown time    butalbital-acetaminophen-caffeine -40 mg (FIORICET, ESGIC) -40 mg per tablet Take 1 tablet by mouth every 4 (four) hours as needed for Pain.   Past Month at Unknown time    EScitalopram oxalate (LEXAPRO) 20 MG tablet Take 20 mg by mouth once daily.   Past Week at Unknown time    hydroCHLOROthiazide (HYDRODIURIL) 25 MG tablet Take 25 mg by mouth once daily.   Past Week at Unknown time    ketorolac (TORADOL) 10 mg tablet Take 10 mg by mouth every 6 (six) hours.   Past Month at Unknown time    metoprolol succinate (TOPROL-XL) 50 MG 24 hr tablet Take 50 mg by mouth once daily.   Past Week at Unknown time    pantoprazole (PROTONIX) 40 MG tablet Take 40 mg by mouth once daily.   Past Week at Unknown time    potassium chloride (KLOR-CON) 10 MEQ TbSR Take 20 mEq by mouth 2 (two) times daily.   Past Week at Unknown time       Codeine, Guaifenesin, Opioids - morphine analogues, and Zanaflex [tizanidine]     Past Medical History:   Diagnosis Date    Coronary artery disease     Encounter for blood transfusion     Hypertension      Past Surgical History:   Procedure Laterality Date    CHOLECYSTECTOMY      CORONARY ANGIOPLASTY WITH STENT PLACEMENT      HYSTERECTOMY       Family History       Problem Relation (Age of Onset)    Anuerysm Paternal Grandmother    Cancer Mother, Father, Brother    Heart disease Father, Son, Paternal Grandfather    Hyperlipidemia Mother, Father    Hypertension Mother, Father, Son, Paternal Grandmother, Paternal Grandfather          Tobacco Use    Smoking status: Never Smoker    Smokeless tobacco: Never  Used   Substance and Sexual Activity    Alcohol use: Not Currently    Drug use: Not Currently    Sexual activity: Not Currently       Review of Systems   Constitutional:  Positive for activity change, chills, diaphoresis, fatigue and fever.   HENT:  Positive for rhinorrhea and sore throat. Negative for sneezing.    Eyes:  Negative for pain and visual disturbance.   Respiratory:  Positive for chest tightness and shortness of breath. Negative for cough and wheezing.    Cardiovascular:  Negative for chest pain, palpitations and leg swelling.   Gastrointestinal:  Negative for abdominal pain, constipation, diarrhea, nausea and vomiting.   Genitourinary:  Negative for dysuria, hematuria and urgency.   Musculoskeletal:  Positive for back pain. Negative for arthralgias and myalgias.   Skin:  Positive for wound (bruising, chronic).   Neurological:  Negative for dizziness, weakness and numbness.   Psychiatric/Behavioral:  Negative for agitation and confusion.    Objective:     Vital Signs (Most Recent):  Temp: 98.2 °F (36.8 °C) (07/10/22 2307)  Pulse: 102 (07/10/22 2307)  Resp: 20 (07/10/22 2307)  BP: (!) 153/87 (07/10/22 2307)  SpO2: 96 % (07/10/22 2307)   Vital Signs (24h Range):  Temp:  [98.2 °F (36.8 °C)-98.8 °F (37.1 °C)] 98.2 °F (36.8 °C)  Pulse:  [] 102  Resp:  [18-38] 20  SpO2:  [95 %-96 %] 96 %  BP: (123-153)/(67-87) 153/87     Weight: 96.4 kg (212 lb 8.4 oz)  Body mass index is 40.16 kg/m².  Body surface area is 2.04 meters squared.    ECOG SCORE           [unfilled]    Lines/Drains/Airways       None                   Physical Exam  Constitutional:       General: She is not in acute distress.     Appearance: Normal appearance. She is obese. She is not diaphoretic.   HENT:      Head: Normocephalic and atraumatic.      Mouth/Throat:      Mouth: Mucous membranes are moist.   Eyes:      Extraocular Movements: Extraocular movements intact.   Cardiovascular:      Rate and Rhythm: Normal rate and regular rhythm.       Pulses: Normal pulses.      Heart sounds: Normal heart sounds. No murmur heard.  Pulmonary:      Effort: Pulmonary effort is normal. No respiratory distress.      Breath sounds: No stridor. Rales (trace bibasilar) present. No wheezing.   Abdominal:      General: Abdomen is flat. There is no distension.      Palpations: Abdomen is soft.      Tenderness: There is no abdominal tenderness. There is no guarding or rebound.   Musculoskeletal:         General: No swelling. Normal range of motion.      Cervical back: Normal range of motion. No rigidity.      Right lower leg: Edema (trace, 1+ to shin) present.      Left lower leg: Edema (trace, 1+ to shin) present.   Skin:     General: Skin is warm and dry.      Findings: Bruising present.   Neurological:      General: No focal deficit present.      Mental Status: She is alert and oriented to person, place, and time. Mental status is at baseline.   Psychiatric:         Mood and Affect: Mood normal.         Behavior: Behavior normal.         Thought Content: Thought content normal.       Significant Labs:   CBC: No results for input(s): WBC, HGB, HCT, PLT in the last 48 hours., CMP: No results for input(s): NA, K, CL, CO2, GLU, BUN, CREATININE, CALCIUM, PROT, ALBUMIN, BILITOT, ALKPHOS, AST, ALT, ANIONGAP, EGFRNONAA in the last 48 hours.    Invalid input(s): ESTGFAFR[place], and All pertinent labs from the last 24 hours have been reviewed.    Diagnostic Results:  I have reviewed all pertinent imaging results/findings within the past 24 hours.    Assessment/Plan:     * Acute hypoxemic respiratory failure  Patient with new complaints of progressive feelings of malaise, rhinorrhea, epistaxis, pleuretic chest pain / chest tightness, subjective fevers, night sweats over the course of approximately 10 days. Lungs with slight rales.Labs remarkable for thrombocytopenia 13, anemia Hgb 9.9, elevated lactate 2.29 > 2.46, elevated , fibrinogen 560, D-dimer 7650. RSV, Influenza, COVID  negative. CXR demonstrated no acute cardiopulmonary process / focal consolidation. OSH CTA chest ordered for concerns for PE given elevated dimer and dyspnea, negative for PE.    Concerns for acute leukemia +/- concurrent infection CAP vs viral pulmonary process given reported upper respiratory symptoms, overall HPI course, and subjective fevers. Less consideration for component of pulmonary congestion given slight rales on exam at bibasilar fields and trace to 1+ edema in ankles/shins, though patient reports these findings are chronic and CXR negative.    - Patient on 4 L NC on arrival, SPO2 mid 90s  - Continuous pulse oximetry  - CXR repeat (OSH CXR did not note focal consolidation)  - CAP coverage: CTX 1 g qd x 5 day course; azithromycin 500 mg qd x 3 day course (given length of symptoms, progressive respiratory symptoms, consideration for CAP vs viral PNA).  - BCx x 2 (first set drawn at OSH)  - Fever trend; notify MD if T > 101  - Trend lacate (per chart review from OSH, lactate elevated to 2.2 > 2.46)  - IVF bolus 500 cc x 1 over 4 hours; monitor volume status / resp status closely.    Thrombocytopenia  Anemia    Patient presenting with thrombocytopenia 13, anemia Hgb 9.9, which patient's daughter says are new findings (previous CBC done at outside ambulatory clinic were reportedly normal). Patient with chronic bruising predating this episode of illness. Elevated , fibrinogen 560, D-dimer 7650. At OSH, heme onc evaluated and concerns for acute leukemia given peripheral smear review demonstrating mononuclear cells. S/P 1 u PLT.    - LDH, D-dimer, fibrinogen, uric acid, phos; continue trend TLS, DIC labs  - Repeat CBC, peripheral smear, flow cytometry  - ANC 2046 (calculated from OSH CBC)  - Type and screen  - Transfuse Hgb < 7, PLT < 10    HTN (hypertension)  Home meds: HCTZ 25 mg, Toprol XL 50 qd    - Continue home medication as tolerated    CAD S/P percutaneous coronary  angioplasty  Hyperlipidemia    Chest tightness seems MSK-related, pleuritic (tightness on inspiration). Non-anginal pain. Low concerns for ACS at this time    - Continue home ASA 81, atorvastatin  - No role for medical therapy or further cardiac w/u at this time    KIRBY on CPAP  - CPAP qhs    GERD (gastroesophageal reflux disease)    - Pepcid qd        VTE Risk Mitigation (From admission, onward)         Ordered     IP VTE LOW RISK PATIENT  Once         07/11/22 0027     Place sequential compression device  Until discontinued         07/11/22 0027                  Zi-On MD Renee  Bone Marrow Transplant  Hematology  [facility] - [facility] ([facility])

## 2022-07-11 NOTE — CARE UPDATE
67 yr old admitted overnight, transfer from OSH where patient presented with fevers, night sweats, fatigue and sob. Noted to have anemia and thrombocytopenia with abnormal cells suspicious for acute leukemia noted on peripheral smear. CTA done st OSH for sob and elevated d-dimer of 7600, negative for PE. No adenopathy noted. Treating with Rocephin/Zithromax for CAP. This am patient became more sob, labored breathing and O2 sats trending down and O2 requirements increasing. Lactic up to 5.5. no TLS noted on labs. Rapid team to bedside. Patient transferred to ICU. Plan for bone marrow biopsy when stable. No evidence of TLS.      Essence Aponte NP  Hematology/BMT

## 2022-07-11 NOTE — CODE/ RAPID DOCUMENTATION
RAPID RESPONSE RESPIRATORY THERAPY NOTE             Code Status: Full Code   : 1955  Bed: 6078/6078 A:   MRN: [id number]  Time page Received: no page received, pre-rapid  Time Rapid Response RT at Bedside: 855  Time Rapid Response RT left Bedside: 926    SITUATION    Evaluated patient for: respiratory distress    BACKGROUND    Why is the patient in the hospital?: Acute hypoxemic respiratory failure    Patient has a past medical history of Coronary artery disease, Encounter for blood transfusion, and Hypertension.    24 Hours Vitals Range:  Temp:  [97.8 °F (36.6 °C)-100 °F (37.8 °C)]   Pulse:  []   Resp:  [18-43]   BP: (123-173)/()   SpO2:  [89 %-96 %]     Labs:    Recent Labs     22  0624   *   K 4.2   CL 98   CO2 17*   CREATININE 0.8   *   PHOS 3.0   MG 1.6        Recent Labs     22  0914   PH 7.326*   PCO2 30.3*   PO2 60*   HCO3 15.8*   POCSATURATED 89*   BE -10       ASSESSMENT/INTERVENTIONS    Upon arrival in room patient in noticeably respiratory distress. Patient on venti mask. ABG obtained. Patient placed on 15L NRB for transport. Patient to be placed on BIPAP with MICU arrival.    Last Vitals: Temp: 100 °F (37.8 °C) (850)  Pulse: 135 (949)  Resp: 39 (949)  BP: 173/111 (0900)  SpO2: 96 % (949)  Level of Consciousness: Level of Consciousness (AVPU): alert  Respiratory Effort: Respiratory Effort: Mild  Expansion/Accessory Muscle Usage: Expansion/Accessory Muscles/Retractions: expansion symmetric  All Lung Field Breath Sounds: All Lung Fields Breath Sounds: diminished  O2 Device/Concentration: Flow (L/min): 4, Oxygen Concentration (%): 100, O2 Device (Oxygen Therapy): BiPAP  NIPPV: No Surgical airway: No Vent: No  ETCO2 monitored:    Ambu at bedside: Ambu bag with the patient?: Yes, Adult Ambu    Active Orders   Respiratory Care    Bipap Continuous     Frequency: Continuous     Number of Occurrences: Until Specified     Order  aNbeel Garrett is a 46year old female. Patient presents with:  ADHD: 3 month med refill   Orders Call: pt is due for mammogram        Subjective    HPI:   Patient presents for recheck of her hypersomnia due to medical condition.   Patient has been using Questions:      Mode Bilevel      FiO2% 60      Inspiratory pressure: 15      Expiratory pressure: 5    CPAP QHS     Frequency: QHS     Number of Occurrences: Until Specified     Order Questions:      Expiratory pressure: 5    Inhalation Treatment Once     Frequency: Once     Number of Occurrences: 1 Occurrences    Inhalation Treatment Q6H     Frequency: Q6H     Number of Occurrences: Until Specified    Oxygen Continuous     Frequency: Continuous     Number of Occurrences: Until Specified     Order Questions:      Device type: Low flow      Device: Nasal Cannula (1- 5 Liters)      LPM: 2      Titrate O2 per Oxygen Titration Protocol: Yes      To maintain SpO2 goal of: >= 90%      Notify MD of: Inability to achieve desired SpO2; Sudden change in patient status and requires 20% increase in FiO2; Patient requires >60% FiO2    Pulse Oximetry Continuous     Frequency: Continuous     Number of Occurrences: Until Specified       RECOMMENDATIONS  ?  We recommend: RRT Recs: Continue POC per primary team.    ESCALATION    Orders received and case discussed with Cat Powers NP and POC reviewed with bedside Bakari MCKINNON.    FOLLOW-UP    Disposition: Tx in ICU bed 6078.    Please call back the Rapid Response RTChyna RRT at x [phone] for any questions or concerns.              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

## 2022-07-11 NOTE — PROGRESS NOTES
Pharmacist Renal Dose Adjustment Note    Kimmy Bass is a 67 y.o. female being treated with the medication famotidine    Patient Data:    Vital Signs (Most Recent):  Temp: 97.8 °F (36.6 °C) (07/11/22 0717)  Pulse: (!) 111 (07/11/22 0717)  Resp: (!) 30 (07/11/22 0717)  BP: (!) 158/70 (07/11/22 0717)  SpO2: 96 % (07/11/22 0717)   Vital Signs (72h Range):  Temp:  [97.8 °F (36.6 °C)-98.8 °F (37.1 °C)]   Pulse:  []   Resp:  [18-38]   BP: (123-158)/(67-87)   SpO2:  [94 %-96 %]      Recent Labs   Lab 07/11/22 0624   CREATININE 0.8     Serum creatinine: 0.8 mg/dL 07/11/22 0624  Estimated creatinine clearance: 71.4 mL/min    Famotidine 20mg daily will be changed to famotidine 20mg BID    Pharmacist's Name: Klaudia Evans  Pharmacist's Extension: [phone]

## 2022-07-11 NOTE — NURSING TRANSFER
Nursing Transfer Note      7/11/2022     Reason patient is being transferred: Increased O2 demand and Increased work of breathing.  Transfer to MICU from .  Transfer via bed.  Chart sent with patient. Report given to Donita.   Daughter at bedside.

## 2022-07-11 NOTE — HPI
66 y/o F PMhx CAD, GERD, HTN, KIRBY w/ CPAP, adenomatous polyp of transverse colon 2018, extensive cancer history in father (brain), mother (bladder), brother (lymphoma) - all  from cancer. Patient presented to OSH due to progressive feelings of malaise, rhinorrhea, epistaxis, pleuretic chest pain / chest tightness, subjective fevers, night sweats over the course of approximately 10 days. Patient had visited walk-in clinic 3 days prior to OSH presentation, left with Rx Augmentin. At that time influenza and COVID tests were negative. Patient continued to have aforementioned symptoms despite antibiotic initiation, and with increasing subjective fevers. She then presented to [facility] for further evaluation as she began to experience symptoms of dyspnea. Patient was mildly hypertensive and afebrile (T max 100.2 per report) on arrival to OSH. Patient dyspneic on arrival, requiring 2-4 L NC to maintain SpO2 mid 90s. Labs remarkable for thrombocytopenia 13, anemia 9.9, elevated lactate 2.29 > 2.46, elevated , fibrinogen 560, D-dimer 7650. RSV, Influenza, COVID negative. CXR demonstrated no acute cardiopulmonary process. CTA chest ordered for concerns for PE given elevated dimer and dyspnea, negative for PE. Heme-onc evaluated, c/f acute leukemia given elevated d-dimer, peripheral smear demonstrating mononuclear cells, transfered to [facility] [place] evaluation. Of note, no palpable LN / weight loss.    Admitted to BMT service for further evaluation of possible new acute leukemia diagnosis.

## 2022-07-11 NOTE — HOSPITAL COURSE
07/11/2022 admitted overnight, transfer from OSH where patient presented with fevers, night sweats, fatigue and sob. Noted to have anemia and thrombocytopenia with abnormal cells suspicious for acute leukemia noted on peripheral smear. CTA done for sob and elevated d-dimer of 7600, negative for PE. No adenopathy noted. Treating with Rocephin/Zithromax for CAP. This am patient became more sob, labored breathing and O2 sats trending down and O2 requirements increasing. Lactic up to 5.5. no TLS noted on labs. Rapid team to bedside. Patient transferred to ICU. Plan for bone marrow biopsy when stable.    07/12/2022 Remains in ICU. Now on HF NC. WBC downtrending. Remains on cefepime/azithro/vanc per ID. CT c/a/p unrevealing for any major signs related to cause of resp failure/shock. Flow and path review still in process. Plan for bone marrow biopsy today. Sending Hep B and HIV testing. Started on ppx antimicrobials. Will need to reconsider repeat Echo given poor image with tachycardia yesterday. Patient only complaint this AM is of feet/toe pain. Tmax overnight 100.5F. Lactate downtrending  07/13/2022 on 80% bipap this am. Chest x-ray this am showing bibasilar atelectasis. Received lasix 60 mg this am. Afebrile, infection work-up unrevealing. Lactic down and wnl this am. On cefepime and Zithromax. Bone marrow results pending, WBC 15.4K, 31% others on differential. 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 Uric acid 8.0, allopurinol started. Noted to have blue discoloration of fingertips and toes. Not on pressors, will check cold agglutinin and cryoglobulin.  07/14/2022 intubated yesterday evening, sedated. FiO2 at 60%. Hypotensive overnight and Norepi started, stopped this am. T.max 102.4 at 11pm. BC obtained this am. On Cefepime and Vanc restarted. Creatinine up to 1.8 with low urine output overnight. Uric acid up to 9.2, allopurinol increased to bid. Bone marrow biopsy reveals HYPERCELLULAR MARROW (60-70%)  WITH ACUTE MYELOID LEUKEMIA, NON-M3 SUBTYPE.

## 2022-07-11 NOTE — ASSESSMENT & PLAN NOTE
Anemia    Patient presenting with thrombocytopenia 13, anemia Hgb 9.9, which patient's daughter says are new findings (previous CBC done at outside ambulatory clinic were reportedly normal). Patient with chronic bruising predating this episode of illness. Elevated , fibrinogen 560, D-dimer 7650. At OSH, heme onc evaluated and concerns for acute leukemia given peripheral smear review demonstrating mononuclear cells. S/P 1 u PLT.    - LDH, D-dimer, fibrinogen, uric acid, phos; continue trend TLS, DIC labs  - Repeat CBC, peripheral smear, flow cytometry  - ANC 2046 (calculated from OSH CBC)  - Type and screen  - Transfuse Hgb < 7, PLT < 10

## 2022-07-11 NOTE — PLAN OF CARE
Plan of care reviewed with the patient upon admission. The patient is alert and oriented. GCS 15. Complaining of mild back pain. PRN medication administered. Per patient she has been feeling fatigued and short of breath over the past few days. Currently on 4lmp O2, SOB worse on exertion. 500ml NaCl bolus administered. IV ABX administered. CXR, UA, and blood cultures ordered. VSS. Bed in low locked position. Call bell and personal items within reach. Will continue to monitor.

## 2022-07-11 NOTE — ASSESSMENT & PLAN NOTE
Patient presenting with thrombocytopenia 13, anemia Hgb 9.9, which patient's daughter says are new findings (previous CBC done at outside ambulatory clinic were reportedly normal). Patient with chronic bruising predating this episode of illness. Elevated , fibrinogen 560, D-dimer 7650. At OSH, heme onc evaluated and concerns for acute leukemia given peripheral smear review demonstrating mononuclear cells. S/P 1 u PLT.     - LDH, D-dimer, fibrinogen, uric acid, phos; continue trend TLS, DIC labs  - Repeat CBC, peripheral smear, flow cytometry  - ANC 2046 (calculated from OSH CBC)  - Type and screen  - Transfuse Hgb < 7, PLT < 10   show

## 2022-07-11 NOTE — ASSESSMENT & PLAN NOTE
Patient noted to have a metabolic acidosis secondary to rising lactate (5.5 to 9.8). Procal 4.66.     --Blood and urine cultures pending  --Broad-spectrum antibiotics  --CT chest/abd/pelvis with contrast  --Gentle IV fluids  --Trend lactate

## 2022-07-11 NOTE — CODE/ RAPID DOCUMENTATION
RAPID RESPONSE NURSE NOTE        Admit Date: 7/10/2022  LOS: 1  Code Status: Full Code   Date of Consult: 2022  : 1955  Age: 67 y.o.  Weight:   Wt Readings from Last 1 Encounters:   07/10/22 96.4 kg (212 lb 8.4 oz)     Sex: female  Race: White   Bed: [address] [place] [address] A:   MRN: [id number]  Time Rapid Response Team page Received: not paged  Time Rapid Response Team at Bedside: 852  Time Rapid Response Team left Bedside: 926  Was the patient discharged from an ICU this admission? No   Was the patient discharged from a PACU within last 24 hours? No   Did the patient receive conscious sedation/general anesthesia in last 24 hours? No  Was the patient in the ED within the past 24 hours? No  Was the patient on NIPPV within the past 24 hours? No   Did this progress into an ARC or CPA: no  Attending Physician: Mason Roberts MD  Primary Service[place] [facility] [place]       SITUATION    Notified by [employer] patient alert.  Reason for alert: increasing work of breathing, increasing oxygen requirements   Called to evaluate the patient for Respiratory    BACKGROUND     Why is the patient in the hospital?: Acute hypoxemic respiratory failure    Patient has a past medical history of Coronary artery disease, Encounter for blood transfusion, and Hypertension.    Last Vitals:  Temp: 100 °F (37.8 °C) ( 0850)  Pulse: 131 (0915)  Resp: 36 (915)  BP: 173/111 ( 0900)  SpO2: 89 % (915) Comment: per ABG    24 Hours Vitals Range:  Temp:  [97.8 °F (36.6 °C)-100 °F (37.8 °C)]   Pulse:  []   Resp:  [18-43]   BP: (123-173)/()   SpO2:  [89 %-96 %]     Labs:  Recent Labs     22  0625   WBC 17.32*   HGB 9.0*   HCT 26.9*       Recent Labs     22  0624   *   K 4.2   CL 98   CO2 17*   CREATININE 0.8   *   PHOS 3.0   MG 1.6        No results for input(s): PH, PCO2, PO2, HCO3, POCSATURATED, BE in the last 72 hours.     ASSESSMENT    Physical Exam  Vitals and nursing note reviewed.    Constitutional:       General: She is awake.      Appearance: She is obese.      Comments: venti mask in place    Cardiovascular:      Rate and Rhythm: Regular rhythm. Tachycardia present.   Pulmonary:      Effort: Tachypnea, accessory muscle usage and respiratory distress present.      Breath sounds: Examination of the left-upper field reveals wheezing. Examination of the left-middle field reveals wheezing. Examination of the left-lower field reveals wheezing. Wheezing present.   Skin:     Coloration: Skin is pale.      Comments: Lips with mild cyanosis    Neurological:      Mental Status: She is alert and oriented to person, place, and time.     Patient seen this morning for increasing oxygen requirements and work of breathing.  Admitted overnight for evaluation of new thrombocytopenia concerning for acute leukemia.  Initially on 4L NC overnight.     On bedside exam, patient pale and short of breath. Appears in distress.  Unable to speak in full sentences. Unable to obtain SpO2 as extremities are cold. Now on venti mask 10L/60%. HR 130s.   Primary team and CCM called to bedside.   AB.32/30.3/60/15.8/89, placed on NRB.   Decision made to transfer patient to ICU for bipap and closer monitoring.     INTERVENTIONS    The patient was seen for a Respiratory problem. Staff concerns included tachypnea, new onset of difficulty breathing and oxygen saturation < 90% despite supplemental oxygen. The following interventions were performed: continued pulse ox monitoring, supplemental oxygen, POCT arterial blood gas  and albuterol-ipratropium (DUO-NEB) 0.5-3 mg/ 3 ml nebulizer for wheezing.    RECOMMENDATIONS    We recommend: transfer to ICU     PROVIDER ESCALATION    Orders received and case discussed with Dr. Mason Roberts & SUNDAY Zaragoza NP.    Disposition: Tx in ICU bed 6073.    FOLLOW UP    Charge Amina HIDALGO updated on plan of care. Instructed to call the Rapid Response Nurse, Cristal Evans RN at [phone] for  additional questions or concerns.

## 2022-07-11 NOTE — H&P
[facility] - Cardiac Medical ICU  Critical Care Medicine  History & Physical    Patient Name: Kimmy Bass  MRN: [id number]  Admission Date: 7/10/2022  Hospital Length of Stay: 1 days  Code Status: Full Code  Attending Physician: Delvin Nunez MD   Primary Care Provider: Faustino Mercer MD   Principal Problem: Acute hypoxemic respiratory failure    Subjective:     HPI:   This is a 66 y/o F PMhx CAD, GERD, HTN, KIRBY w/ CPAP, adenomatous polyp of transverse colon 2018, extensive cancer history in father (brain), mother (bladder), brother (lymphoma) - all  from cancer. Patient presented to OSH due to progressive feelings of malaise, rhinorrhea, epistaxis, pleuretic chest pain / chest tightness, subjective fevers, night sweats over the course of approximately 10 days. Patient had visited walk-in clinic 3 days prior to OSH presentation, left with Rx Augmentin. At that time influenza and COVID tests were negative. Patient continued to have aforementioned symptoms despite antibiotic initiation, and with increasing subjective fevers. She then presented to [facility] for further evaluation as she began to experience symptoms of dyspnea. Patient was mildly hypertensive and afebrile (T max 100.2 per report) on arrival to OSH. Patient dyspneic on arrival, requiring 2-4 L NC to maintain SpO2 mid 90s. Labs remarkable for thrombocytopenia 13, anemia 9.9, elevated lactate 2.29 > 2.46, elevated , fibrinogen 560, D-dimer 7650. RSV, Influenza, COVID negative. CXR demonstrated no acute cardiopulmonary process. CTA chest ordered for concerns for PE given elevated dimer and dyspnea, negative for PE. Heme-onc evaluated, c/f acute leukemia given elevated d-dimer, peripheral smear demonstrating mononuclear cells, [place] [facility] [place]. Of note, no palpable LN / weight loss.     Admitted to BMT service for further evaluation of possible new acute leukemia diagnosis. The morning after [place] [facility] [place] ,  patient became acutely tachypneic, hypoxic, with complaints of chest and back pain. Critical care consulted by rapid response for transfer for hypoxemic respiratory failure requiring bipap.      Hospital/ICU Course:  No notes on file     Past Medical History:   Diagnosis Date    Coronary artery disease     Encounter for blood transfusion     Hypertension        Past Surgical History:   Procedure Laterality Date    CHOLECYSTECTOMY      CORONARY ANGIOPLASTY WITH STENT PLACEMENT      HYSTERECTOMY         Review of patient's allergies indicates:   Allergen Reactions    Codeine     Guaifenesin     Opioids - morphine analogues     Zanaflex [tizanidine]        Family History       Problem Relation (Age of Onset)    Anuerysm Paternal Grandmother    Cancer Mother, Father, Brother    Heart disease Father, Son, Paternal Grandfather    Hyperlipidemia Mother, Father    Hypertension Mother, Father, Son, Paternal Grandmother, Paternal Grandfather          Tobacco Use    Smoking status: Never Smoker    Smokeless tobacco: Never Used   Substance and Sexual Activity    Alcohol use: Not Currently    Drug use: Not Currently    Sexual activity: Not Currently      Review of Systems   Unable to perform ROS: Acuity of condition   Constitutional:  Positive for fatigue. Negative for chills and fever.   Respiratory:  Positive for shortness of breath and wheezing.    Cardiovascular:  Positive for chest pain.   Gastrointestinal:  Negative for abdominal pain, diarrhea, nausea and vomiting.   Genitourinary:  Negative for dysuria.   Musculoskeletal:  Positive for back pain.   Neurological:  Negative for weakness, light-headedness and headaches.   Objective:     Vital Signs (Most Recent):  Temp: 98.5 °F (36.9 °C) (07/11/22 1000)  Pulse: (!) 118 (07/11/22 1226)  Resp: (!) 40 (07/11/22 1226)  BP: 109/76 (07/11/22 1100)  SpO2: 99 % (07/11/22 1226)   Vital Signs (24h Range):  Temp:  [97.8 °F (36.6 °C)-100 °F (37.8 °C)] 98.5 °F (36.9  °C)  Pulse:  [] 118  Resp:  [18-43] 40  SpO2:  [89 %-99 %] 99 %  BP: (109-173)/() 109/76   Weight: 96.4 kg (212 lb 8.4 oz)  Body mass index is 40.16 kg/m².      Intake/Output Summary (Last 24 hours) at 7/11/2022 1356  Last data filed at 7/11/2022 1200  Gross per 24 hour   Intake 599.47 ml   Output 130 ml   Net 469.47 ml       Physical Exam  Constitutional:       General: She is awake. She is in acute distress.      Appearance: She is ill-appearing.   HENT:      Head: Normocephalic and atraumatic.   Cardiovascular:      Rate and Rhythm: Tachycardia present.      Heart sounds: Murmur heard.   Pulmonary:      Effort: Tachypnea, accessory muscle usage and respiratory distress present.      Breath sounds: Wheezing present. No decreased breath sounds, rhonchi or rales.   Abdominal:      General: Abdomen is protuberant.      Palpations: Abdomen is soft.      Tenderness: There is no abdominal tenderness. There is no guarding or rebound.   Musculoskeletal:      Cervical back: Neck supple.   Neurological:      Mental Status: She is alert.      GCS: GCS eye subscore is 4. GCS verbal subscore is 5. GCS motor subscore is 6.      Comments: Unable to complete sentences due to dyspnea       Vents:  Oxygen Concentration (%): 50 (07/11/22 1226)  Lines/Drains/Airways       Drain  Duration                  Urethral Catheter 07/11/22 1147 <1 day                  Significant Labs:    CBC/Anemia Profile:  Recent Labs   Lab 07/11/22  0624 07/11/22  0625 07/11/22  1033 07/11/22  1311   WBC  --  17.32* 18.24*  --    HGB  --  9.0* 8.7*  --    HCT  --  26.9* 25.6* 26*   PLT  --  39* 41*  --    MCV  --  100* 97  --    RDW  --  17.4* 17.2*  --    IRON 75  --   --   --    FERRITIN 2,795*  --   --   --         Chemistries:  Recent Labs   Lab 07/11/22  0624 07/11/22  1033   * 129*   K 4.2 3.6   CL 98 95   CO2 17* 15*   BUN 16 22   CREATININE 0.8 1.2   CALCIUM 8.6* 8.4*   ALBUMIN 3.1* 3.0*   PROT 6.9 6.5   BILITOT 0.5 0.5   ALKPHOS  87 85   ALT 20 35   AST 25 37   MG 1.6 1.6   PHOS 3.0 2.7       All pertinent labs within the past 24 hours have been reviewed.    Significant Imaging: I have reviewed all pertinent imaging results/findings within the past 24 hours.    Assessment/Plan:     Pulmonary  * Acute hypoxemic respiratory failure  Patient with Hypoxic Respiratory failure which is Acute.  she is not on home oxygen. Supplemental oxygen was provided and noted- Oxygen Concentration (%):  [] 50.   Signs/symptoms of respiratory failure include- tachypnea, increased work of breathing, use of accessory muscles and wheezing. Contributing diagnoses includes - COPD and Pneumonia Labs and images were reviewed. Patient Has recent ABG, which has been reviewed. Will treat underlying causes and adjust management of respiratory failure as follows:  CT at OSH negative for PE. CXR grossly unremarkable.    --Metabolic acidosis with respiratory compensation noted on ABG with rising lactate  --Broaden antibiotic coverage  --Wean FiO2 for SpO2 >92%  --Scheduled nebs  --Continuous Bipap  --ABG PRN  --Correct acidosis (see sepsis)  --Upper/lower extremity US r/o DVT  --Echo    Cardiac/Vascular  HTN (hypertension)  Home meds: HCTZ 25 mg, Toprol XL 50 qd    --Hold in the setting of critical illness with sepsis    CAD S/P percutaneous coronary angioplasty  No concerns for ACS at this time.    New Echo pending    ID  Severe sepsis  Patient noted to have a metabolic acidosis secondary to rising lactate (5.5 to 9.8). Procal 4.66.     --Blood and urine cultures pending  --Broad-spectrum antibiotics  --CT chest/abd/pelvis with contrast  --Gentle IV fluids  --Trend lactate        Hematology  Thrombocytopenia  Patient presenting with thrombocytopenia 13, anemia Hgb 9.9, which patient's daughter says are new findings (previous CBC done at outside ambulatory clinic were reportedly normal). Patient with chronic bruising predating this episode of illness. Elevated ,  fibrinogen 560, D-dimer 7650. At OSH, heme onc evaluated and concerns for acute leukemia given peripheral smear review demonstrating mononuclear cells. S/P 1 u PLT.     - LDH, D-dimer, fibrinogen, uric acid, phos; continue trend TLS, DIC labs  - Repeat CBC, peripheral smear, flow cytometry  - ANC 2046 (calculated from OSH CBC)  - Type and screen  - Transfuse Hgb < 7, PLT < 10       Critical Care Time: 45 minutes  Critical secondary to Patient has a condition that poses threat to life and bodily function: Severe Respiratory Distress     Critical care was time spent personally by me on the following activities: development of treatment plan with patient or surrogate and bedside caregivers, discussions with consultants, evaluation of patient's response to treatment, examination of patient, ordering and performing treatments and interventions, ordering and review of laboratory studies, ordering and review of radiographic studies, pulse oximetry, re-evaluation of patient's condition. This critical care time did not overlap with that of any other provider or involve time for any procedures.     Cat Powers, NP  Critical Care Medicine  [facility] - Cardiac Medical ICU

## 2022-07-11 NOTE — ASSESSMENT & PLAN NOTE
Patient with Hypoxic Respiratory failure which is Acute.  she is not on home oxygen. Supplemental oxygen was provided and noted- Oxygen Concentration (%):  [] 50.   Signs/symptoms of respiratory failure include- tachypnea, increased work of breathing, use of accessory muscles and wheezing. Contributing diagnoses includes - COPD and Pneumonia Labs and images were reviewed. Patient Has recent ABG, which has been reviewed. Will treat underlying causes and adjust management of respiratory failure as follows:  CT at OSH negative for PE. CXR grossly unremarkable.    --Metabolic acidosis with respiratory compensation noted on ABG with rising lactate  --Broaden antibiotic coverage  --Wean FiO2 for SpO2 >92%  --Scheduled nebs  --Continuous Bipap  --ABG PRN  --Correct acidosis (see sepsis)  --Upper/lower extremity US r/o DVT  --Echo

## 2022-07-11 NOTE — CONSULTS
Patient transferred to MICU for Bipap; Hypoxemic respiratory failure. See full H&P to follow.    Cat Powers, [facility]  Pulmonary Critical Care  [id number]

## 2022-07-11 NOTE — ASSESSMENT & PLAN NOTE
Hyperlipidemia    Chest tightness seems MSK-related, pleuritic (tightness on inspiration). Non-anginal pain. Low concerns for ACS at this time    - Continue home ASA 81, atorvastatin  - No role for medical therapy or further cardiac w/u at this time

## 2022-07-11 NOTE — SUBJECTIVE & OBJECTIVE
Patient information was obtained from patient, parent, past medical records, and ER records.     Oncology History: N/A No personal oncologic history of note.     Medications Prior to Admission   Medication Sig Dispense Refill Last Dose    amitriptyline (ELAVIL) 10 MG tablet Take 10 mg by mouth nightly as needed for Insomnia.   Past Week at Unknown time    aspirin (ECOTRIN) 81 MG EC tablet Take 81 mg by mouth once daily.   Past Week at Unknown time    atorvastatin (LIPITOR) 20 MG tablet Take 20 mg by mouth once daily.   Past Week at Unknown time    biotin 5,000 mcg TbDL Take 5,000 mcg by mouth Daily.   Past Week at Unknown time    butalbital-acetaminophen-caffeine -40 mg (FIORICET, ESGIC) -40 mg per tablet Take 1 tablet by mouth every 4 (four) hours as needed for Pain.   Past Month at Unknown time    EScitalopram oxalate (LEXAPRO) 20 MG tablet Take 20 mg by mouth once daily.   Past Week at Unknown time    hydroCHLOROthiazide (HYDRODIURIL) 25 MG tablet Take 25 mg by mouth once daily.   Past Week at Unknown time    ketorolac (TORADOL) 10 mg tablet Take 10 mg by mouth every 6 (six) hours.   Past Month at Unknown time    metoprolol succinate (TOPROL-XL) 50 MG 24 hr tablet Take 50 mg by mouth once daily.   Past Week at Unknown time    pantoprazole (PROTONIX) 40 MG tablet Take 40 mg by mouth once daily.   Past Week at Unknown time    potassium chloride (KLOR-CON) 10 MEQ TbSR Take 20 mEq by mouth 2 (two) times daily.   Past Week at Unknown time       Codeine, Guaifenesin, Opioids - morphine analogues, and Zanaflex [tizanidine]     Past Medical History:   Diagnosis Date    Coronary artery disease     Encounter for blood transfusion     Hypertension      Past Surgical History:   Procedure Laterality Date    CHOLECYSTECTOMY      CORONARY ANGIOPLASTY WITH STENT PLACEMENT      HYSTERECTOMY       Family History       Problem Relation (Age of Onset)    Anuerysm Paternal Grandmother    Cancer Mother, Father, Brother     Heart disease Father, Son, Paternal Grandfather    Hyperlipidemia Mother, Father    Hypertension Mother, Father, Son, Paternal Grandmother, Paternal Grandfather          Tobacco Use    Smoking status: Never Smoker    Smokeless tobacco: Never Used   Substance and Sexual Activity    Alcohol use: Not Currently    Drug use: Not Currently    Sexual activity: Not Currently       Review of Systems   Constitutional:  Positive for activity change, chills, diaphoresis, fatigue and fever.   HENT:  Positive for rhinorrhea and sore throat. Negative for sneezing.    Eyes:  Negative for pain and visual disturbance.   Respiratory:  Positive for chest tightness and shortness of breath. Negative for cough and wheezing.    Cardiovascular:  Negative for chest pain, palpitations and leg swelling.   Gastrointestinal:  Negative for abdominal pain, constipation, diarrhea, nausea and vomiting.   Genitourinary:  Negative for dysuria, hematuria and urgency.   Musculoskeletal:  Positive for back pain. Negative for arthralgias and myalgias.   Skin:  Positive for wound (bruising, chronic).   Neurological:  Negative for dizziness, weakness and numbness.   Psychiatric/Behavioral:  Negative for agitation and confusion.    Objective:     Vital Signs (Most Recent):  Temp: 98.2 °F (36.8 °C) (07/10/22 2307)  Pulse: 102 (07/10/22 2307)  Resp: 20 (07/10/22 2307)  BP: (!) 153/87 (07/10/22 2307)  SpO2: 96 % (07/10/22 2307)   Vital Signs (24h Range):  Temp:  [98.2 °F (36.8 °C)-98.8 °F (37.1 °C)] 98.2 °F (36.8 °C)  Pulse:  [] 102  Resp:  [18-38] 20  SpO2:  [95 %-96 %] 96 %  BP: (123-153)/(67-87) 153/87     Weight: 96.4 kg (212 lb 8.4 oz)  Body mass index is 40.16 kg/m².  Body surface area is 2.04 meters squared.    ECOG SCORE           [unfilled]    Lines/Drains/Airways       None                   Physical Exam  Constitutional:       General: She is not in acute distress.     Appearance: Normal appearance. She is obese. She is not diaphoretic.   HENT:       Head: Normocephalic and atraumatic.      Mouth/Throat:      Mouth: Mucous membranes are moist.   Eyes:      Extraocular Movements: Extraocular movements intact.   Cardiovascular:      Rate and Rhythm: Normal rate and regular rhythm.      Pulses: Normal pulses.      Heart sounds: Normal heart sounds. No murmur heard.  Pulmonary:      Effort: Pulmonary effort is normal. No respiratory distress.      Breath sounds: No stridor. Rales (trace bibasilar) present. No wheezing.   Abdominal:      General: Abdomen is flat. There is no distension.      Palpations: Abdomen is soft.      Tenderness: There is no abdominal tenderness. There is no guarding or rebound.   Musculoskeletal:         General: No swelling. Normal range of motion.      Cervical back: Normal range of motion. No rigidity.      Right lower leg: Edema (trace, 1+ to shin) present.      Left lower leg: Edema (trace, 1+ to shin) present.   Skin:     General: Skin is warm and dry.      Findings: Bruising present.   Neurological:      General: No focal deficit present.      Mental Status: She is alert and oriented to person, place, and time. Mental status is at baseline.   Psychiatric:         Mood and Affect: Mood normal.         Behavior: Behavior normal.         Thought Content: Thought content normal.       Significant Labs:   CBC: No results for input(s): WBC, HGB, HCT, PLT in the last 48 hours., CMP: No results for input(s): NA, K, CL, CO2, GLU, BUN, CREATININE, CALCIUM, PROT, ALBUMIN, BILITOT, ALKPHOS, AST, ALT, ANIONGAP, EGFRNONAA in the last 48 hours.    Invalid input(s): EST[place], and All pertinent labs from the last 24 hours have been reviewed.    Diagnostic Results:  I have reviewed all pertinent imaging results/findings within the past 24 hours.

## 2022-07-12 PROBLEM — D72.829 LEUKOCYTOSIS: Status: ACTIVE | Noted: 2022-01-01

## 2022-07-12 PROBLEM — D63.0 ANEMIA IN NEOPLASTIC DISEASE: Status: ACTIVE | Noted: 2022-01-01

## 2022-07-12 PROBLEM — C95.00 ACUTE LEUKEMIA NOT HAVING ACHIEVED REMISSION: Status: ACTIVE | Noted: 2022-01-01

## 2022-07-12 PROBLEM — R74.01 TRANSAMINITIS: Status: ACTIVE | Noted: 2022-01-01

## 2022-07-12 NOTE — PLAN OF CARE
CMICU DAILY GOALS       A: Awake    RASS: Goal -    Actual -     Restraint necessity:    B: Breathe   SBT: Not intubated   C: Coordinate A & B, analgesics/sedatives   Pain: managed    SAT: Not intubated  D: Delirium   CAM-ICU:    E: Early(intubated/ Progressive (non-intubated) Mobility   MOVE Screen: Pass   Activity: Activity Management: Rolling - L1  FAS: Feeding/Nutrition   Diet order: Diet/Nutrition Received: NPO,    T: Thrombus   DVT prophylaxis: VTE Required Core Measure: Provider determined low risk VTE  H: HOB Elevation   Head of Bed (HOB) Positioning: HOB elevated  U: Ulcer Prophylaxis   GI: yes  G: Glucose control   managed    S: Skin   Bathing/Skin Care: linen changed  Device Skin Pressure Protection: skin-to-skin areas padded, skin-to-device areas padded     Pressure Reduction Techniques: weight shift assistance provided  Skin Protection: tubing/devices free from skin contact, transparent dressing maintained, skin-to-skin areas padded, skin-to-device areas padded  B: Bowel Function   no issues   I: Indwelling Catheters   Rosas necessity:      Urethral Catheter 07/11/22 1147-Reason for Continuing Urinary Catheterization: Critically ill in ICU and requiring hourly monitoring of intake/output   CVC necessity: Yes  D: De-escalation Antibiotics   No    Family/Goals of care/Code Status   Code Status: Full Code    24H Vital Sign Range  Temp:  [97.8 °F (36.6 °C)-100 °F (37.8 °C)]   Pulse:  [102-163]   Resp:  [20-46]   BP: (109-173)/()   SpO2:  [89 %-100 %]      Shift Events   Rapid to MICU. Latest lactic 9.8. CT CAP complete as well as echo and US of BL upper/lower extremeties.     VS and assessment per flow sheet, patient progressing towards goals as tolerated, plan of care reviewed with [unfilled] and family, all concerns addressed, will continue to monitor.    Donita Pham

## 2022-07-12 NOTE — PROGRESS NOTES
Oncology LCSW attended to meet with pt. Pt is currently in ICU sleeping peacefully with bipap in place. LCSW will re-attempt.    Neelam Francisco, INGRIDW  Oncology Social Worker License # [id number]  [facility]  [email or web address]  P. [phone]; F. [phone]

## 2022-07-12 NOTE — SUBJECTIVE & OBJECTIVE
Interval History/Significant Events: On bipap overnight; weaned to comfort flow this morning.    Review of Systems   Unable to perform ROS: Acuity of condition   Constitutional:  Positive for fatigue. Negative for chills and fever.   Respiratory:  Negative for cough, shortness of breath and wheezing.    Cardiovascular:  Negative for chest pain.   Gastrointestinal:  Negative for abdominal pain, diarrhea, nausea and vomiting.   Genitourinary:  Negative for dysuria.   Musculoskeletal:  Positive for back pain.   Neurological:  Negative for weakness, light-headedness and headaches.   Objective:     Vital Signs (Most Recent):  Temp: 98.2 °F (36.8 °C) (07/12/22 1100)  Pulse: 75 (07/12/22 1444)  Resp: (!) 33 (07/12/22 1444)  BP: (!) 143/72 (07/12/22 1400)  SpO2: 95 % (07/12/22 1444)   Vital Signs (24h Range):  Temp:  [97.9 °F (36.6 °C)-100.5 °F (38.1 °C)] 98.2 °F (36.8 °C)  Pulse:  [] 75  Resp:  [22-46] 33  SpO2:  [89 %-97 %] 95 %  BP: ()/(51-80) 143/72   Weight: 96.4 kg (212 lb 8.4 oz)  Body mass index is 40.16 kg/m².      Intake/Output Summary (Last 24 hours) at 7/12/2022 1458  Last data filed at 7/12/2022 1401  Gross per 24 hour   Intake 2080.32 ml   Output 740 ml   Net 1340.32 ml       Physical Exam  Constitutional:       General: She is awake. She is not in acute distress.     Appearance: She is ill-appearing.   HENT:      Head: Normocephalic and atraumatic.   Cardiovascular:      Rate and Rhythm: Normal rate.      Heart sounds: Murmur heard.   Pulmonary:      Effort: Pulmonary effort is normal. No tachypnea, accessory muscle usage or respiratory distress.      Breath sounds: Decreased breath sounds present. No wheezing, rhonchi or rales.      Comments: On comfort flow 30L 40%  Abdominal:      General: Abdomen is protuberant.      Palpations: Abdomen is soft.      Tenderness: There is no abdominal tenderness. There is no guarding or rebound.   Musculoskeletal:      Cervical back: Neck supple.   Skin:      Comments: Dusky fingertips   Neurological:      Mental Status: She is alert.      GCS: GCS eye subscore is 4. GCS verbal subscore is 5. GCS motor subscore is 6.      Comments: Unable to complete sentences due to dyspnea       Vents:  Oxygen Concentration (%): 40 (07/12/22 1444)  Lines/Drains/Airways       Drain  Duration                  Urethral Catheter 07/11/22 1147 1 day              Peripheral Intravenous Line  Duration                  Peripheral IV - Single Lumen 07/10/22 1200 22 G Anterior;Left Hand 2 days         Midline Catheter Insertion/Assessment  - Single Lumen 07/12/22 1105 Left cephalic vein (lateral side of arm) 20g x 10cm <1 day         Peripheral IV - Single Lumen 07/11/22 1500 20 G Anterior;Distal;Right Upper Arm <1 day                  Significant Labs:    CBC/Anemia Profile:  Recent Labs   Lab 07/11/22  0624 07/11/22  0625 07/11/22  1033 07/11/22  1311 07/11/22 1940 07/12/22  0752   WBC  --    < > 18.24*  --  23.78* 18.19*   HGB  --    < > 8.7*  --  8.1* 7.0*   HCT  --    < > 25.6* 26* 23.8* 20.4*   PLT  --    < > 41*  --  20* 13*   MCV  --    < > 97  --  101* 97   RDW  --    < > 17.2*  --  17.6* 17.8*   IRON 75  --   --   --   --   --    FERRITIN 2,795*  --   --   --   --   --     < > = values in this interval not displayed.        Chemistries:  Recent Labs   Lab 07/11/22  0624 07/11/22  1033 07/11/22 1940 07/12/22  0350   * 129* 127* 129*   K 4.2 3.6 3.6 4.0   CL 98 95 94* 96   CO2 17* 15* 15* 20*   BUN 16 22 25* 29*   CREATININE 0.8 1.2 1.4 1.3   CALCIUM 8.6* 8.4* 8.4* 8.5*   ALBUMIN 3.1* 3.0*  --  2.5*   PROT 6.9 6.5  --  6.3   BILITOT 0.5 0.5  --  0.9   ALKPHOS 87 85  --  83   ALT 20 35  --  364*   AST 25 37  --  417*   MG 1.6 1.6 1.6 1.6   PHOS 3.0 2.7 2.1* 2.7       All pertinent labs within the past 24 hours have been reviewed.    Significant Imaging:  I have reviewed all pertinent imaging results/findings within the past 24 hours.

## 2022-07-12 NOTE — ASSESSMENT & PLAN NOTE
Hyperlipidemia    Chest tightness seems MSK-related, pleuritic (tightness on inspiration). Non-anginal pain. Low concerns for ACS at this time    - hold ASA with thrombocytopenia  - consider holding atorvastatin with new transaminitis  - No role for medical therapy or further cardiac w/u at this time

## 2022-07-12 NOTE — ASSESSMENT & PLAN NOTE
- WBC 17K on arrival to [facility] [place]  - stable  - continue to monitor with at least daily CBC  - no need for hydrea at this time

## 2022-07-12 NOTE — CONSULTS
Placed 20 g x 10 cm midline in Left cephalic vein for pva using realtime ultrasound guidance.  Lot#[id number].  Max dwell date 8/10/22.  Imagery recorded and saved.

## 2022-07-12 NOTE — PROGRESS NOTES
[facility]  Hematology  Bone Marrow Transplant  Progress Note    Patient Name: Kimmy Bass  Admission Date: 7/10/2022  Hospital Length of Stay: 2 days  Code Status: Full Code    Subjective:     Interval History: Remains in ICU. Now on HF NC. WBC downtrending. Remains on cefepime/azithro/vanc per ID. CT c/a/p unrevealing for any major signs related to cause of resp failure/shock. Flow and path review still in process. Plan for bone marrow biopsy today. Sending Hep B and HIV testing. Started on ppx antimicrobials. Will need to reconsider repeat Echo given poor image with tachycardia yesterday. Patient only complaint this AM is of feet/toe pain. Tmax overnight 100.5F. Lactate downtrending    Objective:     Vital Signs (Most Recent):  Temp: 98 °F (36.7 °C) (07/12/22 0915)  Pulse: 88 (07/12/22 1000)  Resp: (!) 34 (07/12/22 1000)  BP: 117/80 (07/12/22 0915)  SpO2: (!) 90 % (07/12/22 1000)   Vital Signs (24h Range):  Temp:  [97.9 °F (36.6 °C)-100.5 °F (38.1 °C)] 98 °F (36.7 °C)  Pulse:  [] 88  Resp:  [22-46] 34  SpO2:  [90 %-100 %] 90 %  BP: ()/(51-91) 117/80     Weight: 96.4 kg (212 lb 8.4 oz)  Body mass index is 40.16 kg/m².  Body surface area is 2.04 meters squared.    Intake/Output - Last 3 Shifts         07/10 0700 07/11 0659 07/11 0700 07/12 0659 07/12 0700 07/13 0659    I.V. (mL/kg)   44.6 (0.5)    Blood   375    IV Piggyback  1930.2 49.3    Total Intake(mL/kg)  1930.2 (20) 468.9 (4.9)    Urine (mL/kg/hr)  440 (0.2) 280 (0.7)    Total Output  440 280    Net  +1490.2 +188.9                   Physical Exam  Vitals and nursing note reviewed.   Constitutional:       Appearance: She is ill-appearing and toxic-appearing.   HENT:      Head: Normocephalic and atraumatic.      Right Ear: External ear normal.      Left Ear: External ear normal.      Mouth/Throat:      Mouth: Mucous membranes are dry.      Pharynx: No posterior oropharyngeal erythema.   Eyes:      Extraocular Movements: Extraocular movements  intact.      Conjunctiva/sclera: Conjunctivae normal.   Cardiovascular:      Rate and Rhythm: Normal rate and regular rhythm.      Pulses: Normal pulses.      Heart sounds: Normal heart sounds.   Pulmonary:      Effort: Tachypnea present. No respiratory distress.      Breath sounds: Decreased breath sounds and rales present.      Comments: HF NC intact  Abdominal:      General: Bowel sounds are normal.      Palpations: Abdomen is soft.      Tenderness: There is no abdominal tenderness.   Genitourinary:     Comments: Rosas in place  Musculoskeletal:         General: Normal range of motion.      Right lower leg: Edema present.      Left lower leg: Edema present.   Skin:     General: Skin is warm and dry.      Findings: Bruising present. No erythema or rash.   Neurological:      General: No focal deficit present.      Mental Status: She is oriented to person, place, and time.      Motor: Weakness (generalized) present.       Significant Labs:   CBC:   Recent Labs   Lab 07/11/22  0625 07/11/22  1033 07/11/22  1311 07/11/22 1940 07/12/22  0752   WBC 17.32* 18.24*  --  23.78* 18.19*   HGB 9.0* 8.7*  --  8.1* 7.0*   HCT 26.9* 25.6* 26* 23.8* 20.4*   PLT 39* 41*  --  20*  --     and CMP:   Recent Labs   Lab 07/11/22  0624 07/11/22  1033 07/11/22 1940 07/12/22  0350   * 129* 127* 129*   K 4.2 3.6 3.6 4.0   CL 98 95 94* 96   CO2 17* 15* 15* 20*   * 175* 139* 133*   BUN 16 22 25* 29*   CREATININE 0.8 1.2 1.4 1.3   CALCIUM 8.6* 8.4* 8.4* 8.5*   PROT 6.9 6.5  --  6.3   ALBUMIN 3.1* 3.0*  --  2.5*   BILITOT 0.5 0.5  --  0.9   ALKPHOS 87 85  --  83   AST 25 37  --  417*   ALT 20 35  --  364*   ANIONGAP 17* 19* 18* 13   EGFRNONAA >60.0 46.5* 38.6* 42.3*       Diagnostic Results:  I have reviewed all pertinent imaging results/findings within the past 24 hours.    Assessment/Plan:     * Acute hypoxemic respiratory failure  Patient with new complaints of progressive feelings of malaise, rhinorrhea, epistaxis, pleuretic  chest pain / chest tightness, subjective fevers, night sweats over the course of approximately 10 days. Lungs with slight rales.Labs remarkable for thrombocytopenia 13, anemia Hgb 9.9, elevated lactate 2.29 > 2.46, elevated , fibrinogen 560, D-dimer 7650. RSV, Influenza, COVID negative. CXR demonstrated no acute cardiopulmonary process / focal consolidation. OSH CTA chest ordered for concerns for PE given elevated dimer and dyspnea, negative for PE.    Concerns for acute leukemia +/- concurrent infection CAP vs viral pulmonary process given reported upper respiratory symptoms, overall HPI course, and subjective fevers. Less consideration for component of pulmonary congestion given slight rales on exam at bibasilar fields and trace to 1+ edema in ankles/shins, though patient reports these findings are chronic and CXR negative.    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  - newly noted leukocytosis with anemia/thrombocytopenia  - peripheral blasts noted >20%  - flow/smear from 7/11 pending  - Hep B/HIV testing sent  - Echo done 7/11 however poor image due to tachycardia; consider repeating  - plan for bone marrow biopsy at bedside today to complete diagnostic workup    Leukocytosis  - WBC 17K on arrival to [facility] [place]  - stable  - continue to monitor with at least daily CBC  - no need for hydrea at this time    Anemia in neoplastic disease  - monitor at least daily CBC  - transfuse for Hgb <7 or can maintain >8 with any symptoms of ACS/demand needs    Thrombocytopenia  - monitor at least daily CBC  - Transfuse if PLT < 10K or bleeding    Transaminitis  - possible acute liver injury following respiratory  failure/shock  - noted steatosis on CT  - continue to trend  - avoid hepato toxic meds as able    Severe sepsis  - see acute hypoxic resp failure      HTN (hypertension)  Home meds: HCTZ 25 mg, Toprol XL 50 qd    - Continue home medication as tolerated    HLD (hyperlipidemia)  - can consider holding atorvastatin with new transaminitis    CAD S/P percutaneous coronary angioplasty  Hyperlipidemia    Chest tightness seems MSK-related, pleuritic (tightness on inspiration). Non-anginal pain. Low concerns for ACS at this time    - hold ASA with thrombocytopenia  - consider holding atorvastatin with new transaminitis  - No role for medical therapy or further cardiac w/u at this time    KIRBY on CPAP  - CPAP qhs    GERD (gastroesophageal reflux disease)  - Pepcid qd        VTE Risk Mitigation (From admission, onward)         Ordered     IP VTE LOW RISK PATIENT  Once         07/11/22 0027     Place sequential compression device  Until discontinued         07/11/22 0027                Disposition: Remains in ICU    Suellen Sprague NP  Bone Marrow Transplant  [facility]

## 2022-07-12 NOTE — PROGRESS NOTES
Pharmacokinetic Assessment Follow Up: IV Vancomycin    Vancomycin serum concentration assessment(s):    The random level was drawn correctly and can be used to guide therapy at this time. The measurement is below the desired definitive target range of 10 to 20 mcg/mL.     Patient in [facility], Baseline Cr is 0.8 up to 1.3 this AM. Will continue to pulse dose    Vancomycin Regimen Plan:    Give 1500 mg IV once now    Re-dose when the random level is less than 20 mcg/mL, next level to be drawn at 1500 on 7/13    Drug levels (last 3 results):  Recent Labs   Lab Result Units 07/12/22  1344   Vancomycin, Random ug/mL 9.4       Pharmacy will continue to follow and monitor vancomycin.    Please contact pharmacy at extension [phone] for questions regarding this assessment.    Thank you for the consult,   Mayda Pearl       Patient brief summary:  Kimmy Bass is a 67 y.o. female initiated on antimicrobial therapy with IV Vancomycin for treatment of bacteremia    The patient's current regimen is pulse dose based on level    Drug Allergies:   Review of patient's allergies indicates:   Allergen Reactions    Codeine     Guaifenesin     Opioids - morphine analogues     Zanaflex [tizanidine]        Actual Body Weight:   96.4 kg    Renal Function:   Estimated Creatinine Clearance: 43.9 mL/min (based on SCr of 1.3 mg/dL).,     Dialysis Method (if applicable):  N/A    CBC (last 72 hours):  Recent Labs   Lab Result Units 07/11/22  0624 07/11/22  0625 07/11/22  1033 07/11/22  1940 07/12/22  0752   WBC K/uL  --  17.32* 18.24* 23.78* 18.19*   Hemoglobin g/dL  --  9.0* 8.7* 8.1* 7.0*   Hemoglobin A1C % 6.4*  --   --   --   --    Hematocrit %  --  26.9* 25.6* 23.8* 20.4*   Platelets K/uL  --  39* 41* 20* 13*   Gran % %  --  45.0 58.0 33.0* 32.0*   Lymph % %  --  19.0 16.0* 26.0 21.0   Mono % %  --  11.0 4.0 9.0 9.0   Eosinophil % %  --  0.0 0.0 0.0 0.0   Basophil % %  --  0.0 0.0 0.0 0.0   Differential Method   --  Manual Manual Manual  Manual       Metabolic Panel (last 72 hours):  Recent Labs   Lab Result Units 07/11/22  0624 07/11/22  0953 07/11/22  1033 07/11/22  1940 07/12/22  0350   Sodium mmol/L 132*  --  129* 127* 129*   Potassium mmol/L 4.2  --  3.6 3.6 4.0   Chloride mmol/L 98  --  95 94* 96   CO2 mmol/L 17*  --  15* 15* 20*   Glucose mg/dL 159*  --  175* 139* 133*   Glucose, UA   --  1+*  --   --   --    BUN mg/dL 16  --  22 25* 29*   Creatinine mg/dL 0.8  --  1.2 1.4 1.3   Albumin g/dL 3.1*  --  3.0*  --  2.5*   Total Bilirubin mg/dL 0.5  --  0.5  --  0.9   Alkaline Phosphatase U/L 87  --  85  --  83   AST U/L 25  --  37  --  417*   ALT U/L 20  --  35  --  364*   Magnesium mg/dL 1.6  --  1.6 1.6 1.6   Phosphorus mg/dL 3.0  --  2.7 2.1* 2.7       Vancomycin Administrations:  vancomycin given in the last 96 hours                   vancomycin 1.75 g in 5 % dextrose 500 mL IVPB (mg) 1,750 mg New Bag 07/11/22 1419                Microbiologic Results:  Microbiology Results (last 7 days)     Procedure Component Value Units Date/Time    Urine Culture High Risk [[id number]] Collected: 07/11/22 0953    Order Status: Completed Specimen: Urine, Catheterized Updated: 07/12/22 1420     Urine Culture, Routine No growth    Narrative:      Indicated criteria for high risk culture:->Other  Other (specify):->sepsis    Blood culture [[id number]] Collected: 07/11/22 0624    Order Status: Completed Specimen: Blood Updated: 07/12/22 0812     Blood Culture, Routine No Growth to date      No Growth to date    Blood culture [[id number]] Collected: 07/11/22 0624    Order Status: Completed Specimen: Blood Updated: 07/12/22 0812     Blood Culture, Routine No Growth to date      No Growth to date    Respiratory Infection Panel (PCR), Nasopharyngeal [[id number]] Collected: 07/11/22 1210    Order Status: Completed Specimen: Nasopharyngeal Swab Updated: 07/11/22 1413     Respiratory Infection Panel Source NP Swab     Adenovirus Not Detected     Coronavirus 229E, Common  Cold Virus Not Detected     Coronavirus HKU1, Common Cold Virus Not Detected     Coronavirus NL63, Common Cold Virus Not Detected     Coronavirus OC43, Common Cold Virus Not Detected     Comment: The Coronavirus strains detected in this test cause the common cold.  These strains are not the COVID-19 (novel Coronavirus)strain   associated with the respiratory disease outbreak.          SARS-CoV2 (COVID-19) Qualitative PCR Not Detected     Human Metapneumovirus Not Detected     Human Rhinovirus/Enterovirus Not Detected     Influenza A (subtypes H1, H1-2009,H3) Not Detected     Influenza B Not Detected     Parainfluenza Virus 1 Not Detected     Parainfluenza Virus 2 Not Detected     Parainfluenza Virus 3 Not Detected     Parainfluenza Virus 4 Not Detected     Respiratory Syncytial Virus Not Detected     Bordetella Parapertussis (ON7948) Not Detected     Bordetella pertussis (ptxP) Not Detected     Chlamydia pneumoniae Not Detected     Mycoplasma pneumoniae Not Detected    Narrative:      For all other respiratory sources, order [id number] -  Respiratory Viral Panel by PCR

## 2022-07-12 NOTE — ASSESSMENT & PLAN NOTE
Patient presenting with thrombocytopenia 13, anemia Hgb 9.9, which patient's daughter says are new findings (previous CBC done at outside ambulatory clinic were reportedly normal). Patient with chronic bruising predating this episode of illness. Elevated , fibrinogen 560, D-dimer 7650. At OSH, heme onc evaluated and concerns for acute leukemia given peripheral smear review demonstrating mononuclear cells. S/P 1 u PLT.     - Type and screen  - Transfuse if PLT < 10 or if bleeding

## 2022-07-12 NOTE — ASSESSMENT & PLAN NOTE
- possible acute liver injury following respiratory failure/shock  - noted steatosis on CT  - continue to trend  - avoid hepato toxic meds as able

## 2022-07-12 NOTE — CONSULTS
NIAS consulted to check midline placement.  Midline repositioned, dressing changed.  Midline patent and comfortable for patient at this time.  Re consult NIAS if needed.

## 2022-07-12 NOTE — PLAN OF CARE
SW contacted Neelam Francisco LCSW for Oncology service, for discharge planning needs.   Neelam confirmed that she will follow patient on MICU.  No interaction needed from this SW (Nunu Dwyer LMSW).    Nunu Dwyer LMSW  [facility]  [phone]

## 2022-07-12 NOTE — ASSESSMENT & PLAN NOTE
Patient with Hypoxic Respiratory failure which is Acute.  she is not on home oxygen. Supplemental oxygen was provided and noted- Oxygen Concentration (%):  [40-60] 40.   Signs/symptoms of respiratory failure include- tachypnea, increased work of breathing, use of accessory muscles and wheezing. Contributing diagnoses includes - COPD and Pneumonia Labs and images were reviewed. Patient Has recent ABG, which has been reviewed. Will treat underlying causes and adjust management of respiratory failure as follows:  CT at OSH negative for PE. CXR grossly unremarkable.  US negative for DVTs    --Broad-spectrum antibiotics  --Wean FiO2 for SpO2 >92%  --Scheduled nebs  --Bipap QHS and PRN  --ABG PRN  --May need repeat Echo as first study with difficult windows

## 2022-07-12 NOTE — PLAN OF CARE
CMICU DAILY GOALS       A: Awake    RASS: Goal -    Actual -     Restraint necessity:    B: Breathe   SBT: Not intubated   C: Coordinate A & B, analgesics/sedatives   Pain: managed    SAT: Not intubated  D: Delirium   CAM-ICU: Overall CAM-ICU: Negative  E: Early(intubated/ Progressive (non-intubated) Mobility   MOVE Screen: Pass   Activity: Activity Management: Rolling - L1  FAS: Feeding/Nutrition   Diet order: Diet/Nutrition Received: clear liquid,    T: Thrombus   DVT prophylaxis: VTE Required Core Measure: Pharmacological prophylaxis initiated/maintained  H: HOB Elevation   Head of Bed (HOB) Positioning: HOB at 30-45 degrees  U: Ulcer Prophylaxis   GI: yes  G: Glucose control   managed Glycemic Management: blood glucose monitored  S: Skin   Bathing/Skin Care: linen changed, incontinence care  Device Skin Pressure Protection: skin-to-skin areas padded, skin-to-device areas padded  Pressure Reduction Devices: pressure-redistributing mattress utilized, positioning supports utilized, heel offloading device utilized  Pressure Reduction Techniques: weight shift assistance provided  Skin Protection: tubing/devices free from skin contact, transparent dressing maintained, skin-to-skin areas padded, skin-to-device areas padded  B: Bowel Function   no issues   I: Indwelling Catheters   Rosas necessity:      Urethral Catheter 07/11/22 1147-Reason for Continuing Urinary Catheterization: Critically ill in ICU and requiring hourly monitoring of intake/output   CVC necessity: Yes  D: De-escalation Antibiotics   No    Family/Goals of care/Code Status   Code Status: Full Code    24H Vital Sign Range  Temp:  [97.9 °F (36.6 °C)-100.5 °F (38.1 °C)]   Pulse:  []   Resp:  [22-39]   BP: ()/(51-80)   SpO2:  [89 %-99 %]      Shift Events   Alternating from bipap to comfort flow. Bone marrow biopsy done.     VS and assessment per flow sheet, patient progressing towards goals as tolerated, plan of care reviewed with [unfilled] and  family, all concerns addressed, will continue to monitor.    Donita Pham

## 2022-07-12 NOTE — ASSESSMENT & PLAN NOTE
Patient noted to have a metabolic acidosis secondary to rising lactate (5.5 to 9.8). Procal 4.66.   Lactate trending down. CT chest/abd/pelvis with no foci of infection.    --Blood and urine cultures pending  --Broad-spectrum antibiotics

## 2022-07-12 NOTE — PROGRESS NOTES
[facility] - Card[facility] Medical [facility]  Critical Care Medicine  Progress Note    Patient Name: Kimmy Bass  MRN: [id number]  Admission Date: 7/10/2022  Hospital Length of Stay: 2 days  Code Status: Full Code  Attending Provider: Delvin Nunez MD  Primary Care Provider: Faustino Mercer MD   Principal Problem: Acute hypoxemic respiratory failure    Subjective:     HPI:   This is a 66 y/o F PMhx CAD, GERD, HTN, KIRBY w/ CPAP, adenomatous polyp of transverse colon 2018, extensive cancer history in father (brain), mother (bladder), brother (lymphoma) - all  from cancer. Patient presented to OSH due to progressive feelings of malaise, rhinorrhea, epistaxis, pleuretic chest pain / chest tightness, subjective fevers, night sweats over the course of approximately 10 days. Patient had visited walk-in clinic 3 days prior to OSH presentation, left with Rx Augmentin. At that time influenza and COVID tests were negative. Patient continued to have aforementioned symptoms despite antibiotic initiation, and with increasing subjective fevers. She then presented to [facility] for further evaluation as she began to experience symptoms of dyspnea. Patient was mildly hypertensive and afebrile (T max 100.2 per report) on arrival to OSH. Patient dyspneic on arrival, requiring 2-4 L NC to maintain SpO2 mid 90s. Labs remarkable for thrombocytopenia 13, anemia 9.9, elevated lactate 2.29 > 2.46, elevated , fibrinogen 560, D-dimer 7650. RSV, Influenza, COVID negative. CXR demonstrated no acute cardiopulmonary process. CTA chest ordered for concerns for PE given elevated dimer and dyspnea, negative for PE. Heme-onc evaluated, c/f acute leukemia given elevated d-dimer, peripheral smear demonstrating mononuclear cells, transfered to [facility] for further evaluation. Of note, no palpable LN / weight loss.           Hospital/ICU Course:  Admitted to BMT service on  for further evaluation of possible new acute leukemia diagnosis. The morning  after admission to [facility] on 7/11, patient became acutely tachypneic, hypoxic, with complaints of chest and back pain. Critical care consulted by rapid response for transfer for hypoxemic respiratory failure requiring bipap. Weaned to comfort flow on 7/12; bone marrow biopsy completed.       Interval History/Significant Events: On bipap overnight; weaned to comfort flow this morning.    Review of Systems   Unable to perform ROS: Acuity of condition   Constitutional:  Positive for fatigue. Negative for chills and fever.   Respiratory:  Negative for cough, shortness of breath and wheezing.    Cardiovascular:  Negative for chest pain.   Gastrointestinal:  Negative for abdominal pain, diarrhea, nausea and vomiting.   Genitourinary:  Negative for dysuria.   Musculoskeletal:  Positive for back pain.   Neurological:  Negative for weakness, light-headedness and headaches.   Objective:     Vital Signs (Most Recent):  Temp: 98.2 °F (36.8 °C) (07/12/22 1100)  Pulse: 75 (07/12/22 1444)  Resp: (!) 33 (07/12/22 1444)  BP: (!) 143/72 (07/12/22 1400)  SpO2: 95 % (07/12/22 1444)   Vital Signs (24h Range):  Temp:  [97.9 °F (36.6 °C)-100.5 °F (38.1 °C)] 98.2 °F (36.8 °C)  Pulse:  [] 75  Resp:  [22-46] 33  SpO2:  [89 %-97 %] 95 %  BP: ()/(51-80) 143/72   Weight: 96.4 kg (212 lb 8.4 oz)  Body mass index is 40.16 kg/m².      Intake/Output Summary (Last 24 hours) at 7/12/2022 1458  Last data filed at 7/12/2022 1401  Gross per 24 hour   Intake 2080.32 ml   Output 740 ml   Net 1340.32 ml       Physical Exam  Constitutional:       General: She is awake. She is not in acute distress.     Appearance: She is ill-appearing.   HENT:      Head: Normocephalic and atraumatic.   Cardiovascular:      Rate and Rhythm: Normal rate.      Heart sounds: Murmur heard.   Pulmonary:      Effort: Pulmonary effort is normal. No tachypnea, accessory muscle usage or respiratory distress.      Breath sounds: Decreased breath sounds present. No wheezing,  rhonchi or rales.      Comments: On comfort flow 30L 40%  Abdominal:      General: Abdomen is protuberant.      Palpations: Abdomen is soft.      Tenderness: There is no abdominal tenderness. There is no guarding or rebound.   Musculoskeletal:      Cervical back: Neck supple.   Skin:     Comments: Dusky fingertips   Neurological:      Mental Status: She is alert.      GCS: GCS eye subscore is 4. GCS verbal subscore is 5. GCS motor subscore is 6.      Comments: Unable to complete sentences due to dyspnea       Vents:  Oxygen Concentration (%): 40 (07/12/22 1444)  Lines/Drains/Airways       Drain  Duration                  Urethral Catheter 07/11/22 1147 1 day              Peripheral Intravenous Line  Duration                  Peripheral IV - Single Lumen 07/10/22 1200 22 G Anterior;Left Hand 2 days         Midline Catheter Insertion/Assessment  - Single Lumen 07/12/22 1105 Left cephalic vein (lateral side of arm) 20g x 10cm <1 day         Peripheral IV - Single Lumen 07/11/22 1500 20 G Anterior;Distal;Right Upper Arm <1 day                  Significant Labs:    CBC/Anemia Profile:  Recent Labs   Lab 07/11/22  0624 07/11/22  0625 07/11/22  1033 07/11/22  1311 07/11/22 1940 07/12/22  0752   WBC  --    < > 18.24*  --  23.78* 18.19*   HGB  --    < > 8.7*  --  8.1* 7.0*   HCT  --    < > 25.6* 26* 23.8* 20.4*   PLT  --    < > 41*  --  20* 13*   MCV  --    < > 97  --  101* 97   RDW  --    < > 17.2*  --  17.6* 17.8*   IRON 75  --   --   --   --   --    FERRITIN 2,795*  --   --   --   --   --     < > = values in this interval not displayed.        Chemistries:  Recent Labs   Lab 07/11/22  0624 07/11/22  1033 07/11/22 1940 07/12/22  0350   * 129* 127* 129*   K 4.2 3.6 3.6 4.0   CL 98 95 94* 96   CO2 17* 15* 15* 20*   BUN 16 22 25* 29*   CREATININE 0.8 1.2 1.4 1.3   CALCIUM 8.6* 8.4* 8.4* 8.5*   ALBUMIN 3.1* 3.0*  --  2.5*   PROT 6.9 6.5  --  6.3   BILITOT 0.5 0.5  --  0.9   ALKPHOS 87 85  --  83   ALT 20 35  --  364*    AST 25 37  --  417*   MG 1.6 1.6 1.6 1.6   PHOS 3.0 2.7 2.1* 2.7       All pertinent labs within the past 24 hours have been reviewed.    Significant Imaging:  I have reviewed all pertinent imaging results/findings within the past 24 hours.      ABG  Recent Labs   Lab 07/12/22  0826   PH 7.458*   PO2 84   PCO2 31.0*   HCO3 21.9*   BE -2     Assessment/Plan:     Pulmonary  * Acute hypoxemic respiratory failure  Patient with Hypoxic Respiratory failure which is Acute.  she is not on home oxygen. Supplemental oxygen was provided and noted- Oxygen Concentration (%):  [40-60] 40.   Signs/symptoms of respiratory failure include- tachypnea, increased work of breathing, use of accessory muscles and wheezing. Contributing diagnoses includes - COPD and Pneumonia Labs and images were reviewed. Patient Has recent ABG, which has been reviewed. Will treat underlying causes and adjust management of respiratory failure as follows:  CT at OSH negative for PE. CXR grossly unremarkable.  US negative for DVTs    --Broad-spectrum antibiotics  --Wean FiO2 for SpO2 >92%  --Scheduled nebs  --Bipap QHS and PRN  --ABG PRN  --May need repeat Echo as first study with difficult windows      Cardiac/Vascular  HTN (hypertension)  Home meds: HCTZ 25 mg, Toprol XL 50 qd    --Hold in the setting of critical illness with sepsis    CAD S/P percutaneous coronary angioplasty  No concerns for ACS at this time.    --Repeat Echo    ID  Severe sepsis  Patient noted to have a metabolic acidosis secondary to rising lactate (5.5 to 9.8). Procal 4.66.   Lactate trending down. CT chest/abd/pelvis with no foci of infection.    --Blood and urine cultures pending  --Broad-spectrum antibiotics          Hematology  Thrombocytopenia  Patient presenting with thrombocytopenia 13, anemia Hgb 9.9, which patient's daughter says are new findings (previous CBC done at outside ambulatory clinic were reportedly normal). Patient with chronic bruising predating this episode of  illness. Elevated , fibrinogen 560, D-dimer 7650. At OSH, heme onc evaluated and concerns for acute leukemia given peripheral smear review demonstrating mononuclear cells. S/P 1 u PLT.     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    - BMT following  - peripheral blasts noted >20%  - flow/smear from 7/11 pending  - Hep B/HIV testing sent  - Bone marrow biopsy today    Anemia in neoplastic disease  Maintain current type and screen    --Transfuse if Hgb<7 or bleeding    GI  Transaminitis  Possible liver injury after respiratory failure/shock. Steatosis noted on CT.    --Trend LFTs  --Avoid hepatotoxic medications    Other  KIRBY on CPAP  Bipap QHS       Critical Care Daily Checklist:    A: Awake: RASS Goal/Actual Goal:    Actual: [place] Agitation Sedation Scale (RASS): Alert and calm   B: Spontaneous Breathing Trial Performed?     C: SAT & SBT Coordinated?  NA                      D: Delirium: CAM-ICU Overall CAM-ICU: Negative   E: Early Mobility Performed? Yes   F: Feeding Goal:    Status:     Current Diet Order   Procedures    Diet clear liquid      AS: Analgesia/Sedation Tylenol PRN   T: Thromboembolic Prophylaxis Held for thrombocytopenia   H: HOB > 300 Yes   U: Stress Ulcer Prophylaxis (if needed) Pepcid   G: Glucose Control SSI   B: Bowel Function     I: Indwelling Catheter (Lines & Rosas) Necessity Rosas  PIVs   D: De-escalation of Antimicrobials/Pharmacotherapies Continue for now    Plan for the day/ETD Bone marroe biopsy    Code Status:  Family/Goals of Care: Full Code  Daughter at bedside     Critical Care Time: 35 minutes  Critical secondary to Patient has a condition that poses threat to life and bodily function: Severe Respiratory Distress      Critical care was time spent personally by me on the following activities: development of  treatment plan with patient or surrogate and bedside caregivers, discussions with consultants, evaluation of patient's response to treatment, examination of patient, ordering and performing treatments and interventions, ordering and review of laboratory studies, ordering and review of radiographic studies, pulse oximetry, re-evaluation of patient's condition. This critical care time did not overlap with that of any other provider or involve time for any procedures.     Cat Powers NP  Critical Care Medicine  [facility] - Cardiac Medical ICU

## 2022-07-12 NOTE — ASSESSMENT & PLAN NOTE
- newly noted leukocytosis with anemia/thrombocytopenia  - peripheral blasts noted >20%  - flow/smear from 7/11 pending  - Hep B/HIV testing sent  - Echo done 7/11 however poor image due to tachycardia; consider repeating  - plan for bone marrow biopsy at bedside today to complete diagnostic workup

## 2022-07-12 NOTE — PROCEDURES
PROCEDURE NOTE:  Date of Procedure: 07/12/2022  Bone Marrow Biopsy and Aspiration  Indication: acute leukemia; new leukocytosis with anemia/thrombocytopenia  Consent: Informed consent was obtained from patient.  Timeout: Done and documented.  Position: left lateral  Site: right posterior illiac crest.  Prep: Betadine.  Needle used: 11 gauge Jamshidi needle.  Anesthetic: 2% lidocaine 10 cc.  Biopsy: The biopsy needle was introduced into the marrow cavity and an aspirate was obtained without complications and sent for flow cytometry, AML fish, FLT3, NPM1, NGS, DNA/RNA hold, and cytogenetics. Core biopsy obtained without difficulty and sent for routine histologic examination.  Complications: None.  Disposition: The patient was placed supine for 15min following procedure. RN to assess bandaid for bleeding. Can remove dressing in 24hrs  Blood loss: Minimal.     Suellen Sprague NP  Hematology/Oncology/BMT

## 2022-07-12 NOTE — SUBJECTIVE & OBJECTIVE
Subjective:     Interval History: Remains in ICU. Now on HF NC. WBC downtrending. Remains on cefepime/azithro/vanc per ID. CT c/a/p unrevealing for any major signs related to cause of resp failure/shock. Flow and path review still in process. Plan for bone marrow biopsy today. Sending Hep B and HIV testing. Started on ppx antimicrobials. Will need to reconsider repeat Echo given poor image with tachycardia yesterday. Patient only complaint this AM is of feet/toe pain. Tmax overnight 100.5F. Lactate downtrending    Objective:     Vital Signs (Most Recent):  Temp: 98 °F (36.7 °C) (07/12/22 0915)  Pulse: 88 (07/12/22 1000)  Resp: (!) 34 (07/12/22 1000)  BP: 117/80 (07/12/22 0915)  SpO2: (!) 90 % (07/12/22 1000)   Vital Signs (24h Range):  Temp:  [97.9 °F (36.6 °C)-100.5 °F (38.1 °C)] 98 °F (36.7 °C)  Pulse:  [] 88  Resp:  [22-46] 34  SpO2:  [90 %-100 %] 90 %  BP: ()/(51-91) 117/80     Weight: 96.4 kg (212 lb 8.4 oz)  Body mass index is 40.16 kg/m².  Body surface area is 2.04 meters squared.    Intake/Output - Last 3 Shifts         07/10 0700 07/11 0659 07/11 0700 07/12 0659 07/12 0700 07/13 0659    I.V. (mL/kg)   44.6 (0.5)    Blood   375    IV Piggyback  1930.2 49.3    Total Intake(mL/kg)  1930.2 (20) 468.9 (4.9)    Urine (mL/kg/hr)  440 (0.2) 280 (0.7)    Total Output  440 280    Net  +1490.2 +188.9                   Physical Exam  Vitals and nursing note reviewed.   Constitutional:       Appearance: She is ill-appearing and toxic-appearing.   HENT:      Head: Normocephalic and atraumatic.      Right Ear: External ear normal.      Left Ear: External ear normal.      Mouth/Throat:      Mouth: Mucous membranes are dry.      Pharynx: No posterior oropharyngeal erythema.   Eyes:      Extraocular Movements: Extraocular movements intact.      Conjunctiva/sclera: Conjunctivae normal.   Cardiovascular:      Rate and Rhythm: Normal rate and regular rhythm.      Pulses: Normal pulses.      Heart sounds: Normal  heart sounds.   Pulmonary:      Effort: Tachypnea present. No respiratory distress.      Breath sounds: Decreased breath sounds and rales present.      Comments: HF NC intact  Abdominal:      General: Bowel sounds are normal.      Palpations: Abdomen is soft.      Tenderness: There is no abdominal tenderness.   Genitourinary:     Comments: Rosas in place  Musculoskeletal:         General: Normal range of motion.      Right lower leg: Edema present.      Left lower leg: Edema present.   Skin:     General: Skin is warm and dry.      Findings: Bruising present. No erythema or rash.   Neurological:      General: No focal deficit present.      Mental Status: She is oriented to person, place, and time.      Motor: Weakness (generalized) present.       Significant Labs:   CBC:   Recent Labs   Lab 07/11/22  0625 07/11/22  1033 07/11/22  1311 07/11/22 1940 07/12/22  0752   WBC 17.32* 18.24*  --  23.78* 18.19*   HGB 9.0* 8.7*  --  8.1* 7.0*   HCT 26.9* 25.6* 26* 23.8* 20.4*   PLT 39* 41*  --  20*  --     and CMP:   Recent Labs   Lab 07/11/22  0624 07/11/22  1033 07/11/22 1940 07/12/22  0350   * 129* 127* 129*   K 4.2 3.6 3.6 4.0   CL 98 95 94* 96   CO2 17* 15* 15* 20*   * 175* 139* 133*   BUN 16 22 25* 29*   CREATININE 0.8 1.2 1.4 1.3   CALCIUM 8.6* 8.4* 8.4* 8.5*   PROT 6.9 6.5  --  6.3   ALBUMIN 3.1* 3.0*  --  2.5*   BILITOT 0.5 0.5  --  0.9   ALKPHOS 87 85  --  83   AST 25 37  --  417*   ALT 20 35  --  364*   ANIONGAP 17* 19* 18* 13   EGFRNONAA >60.0 46.5* 38.6* 42.3*       Diagnostic Results:  I have reviewed all pertinent imaging results/findings within the past 24 hours.

## 2022-07-12 NOTE — HOSPITAL COURSE
Admitted to BMT service on 7/11 for further evaluation of possible new acute leukemia diagnosis. The morning after admission to [facility] on 7/11, patient became acutely tachypneic, hypoxic, with complaints of chest and back pain. Critical care consulted by rapid response for transfer for hypoxemic respiratory failure requiring bipap. Weaned to comfort flow on 7/12; bone marrow biopsy completed. Intubated on 7/13 for increased work of breathing and worsening alkalosis.

## 2022-07-12 NOTE — PROGRESS NOTES
Oncology LCSW attempted to meet with pt but pt is in respiratory distress and was transferred to ICU. LCSW will re-attempt.       Neelam Francisco, RAVI  Oncology Social Worker License # [id number]  [facility]  [email or web address]  P. [phone]; F. [phone]

## 2022-07-12 NOTE — ASSESSMENT & PLAN NOTE
Patient with new complaints of progressive feelings of malaise, rhinorrhea, epistaxis, pleuretic chest pain / chest tightness, subjective fevers, night sweats over the course of approximately 10 days. Lungs with slight rales.Labs remarkable for thrombocytopenia 13, anemia Hgb 9.9, elevated lactate 2.29 > 2.46, elevated , fibrinogen 560, D-dimer 7650. RSV, Influenza, COVID negative. CXR demonstrated no acute cardiopulmonary process / focal consolidation. OSH CTA chest ordered for concerns for PE given elevated dimer and dyspnea, negative for PE.    Concerns for acute leukemia +/- concurrent infection CAP vs viral pulmonary process given reported upper respiratory symptoms, overall HPI course, and subjective fevers. Less consideration for component of pulmonary congestion given slight rales on exam at bibasilar fields and trace to 1+ edema in ankles/shins, though patient reports these findings are chronic and CXR negative.    - sent to ICU on arrival to BMT unit for worsening resp status  - placed on bipap; now on HF NC  - BCx x 2 NGTD  - CXR and CT c/a/p unrevealing for resp cause; small bilateral pleural effusions noted  -   - Trend lacate; improving  - continue cefepime/vanc/azithro  - further management per ICU

## 2022-07-12 NOTE — CONSULTS
BMT team will follow patient daily. When stable, can be transferred back to BMT service. Please see our daily progress notes.

## 2022-07-12 NOTE — ASSESSMENT & PLAN NOTE
Possible liver injury after respiratory failure/shock. Steatosis noted on CT.    --Trend LFTs  --Avoid hepatotoxic medications

## 2022-07-12 NOTE — ASSESSMENT & PLAN NOTE
- monitor at least daily CBC  - transfuse for Hgb <7 or can maintain >8 with any symptoms of ACS/demand needs

## 2022-07-12 NOTE — ASSESSMENT & PLAN NOTE
[place] [facility] [place] with new leukocytosis with anemia/thrombocytopenia.    - BMT following  - peripheral blasts noted >20%  - flow/smear from 7/11 pending  - Hep B/HIV testing sent  - Bone marrow biopsy today

## 2022-07-13 PROBLEM — E83.39 HYPOPHOSPHATEMIA: Status: ACTIVE | Noted: 2022-01-01

## 2022-07-13 PROBLEM — I48.91 ATRIAL FIBRILLATION WITH RVR: Status: ACTIVE | Noted: 2022-01-01

## 2022-07-13 NOTE — ASSESSMENT & PLAN NOTE
Patient with Hypoxic Respiratory failure which is Acute.  she is not on home oxygen. Supplemental oxygen was provided and noted- Vent Mode: A/C  Oxygen Concentration (%):  [] 80  Resp Rate Total:  [26 br/min] 26 br/min  Vt Set:  [340 mL] 340 mL  PEEP/CPAP:  [8 cmH20] 8 cmH20  Mean Airway Pressure:  [13 cmH20] 13 cmH20.   Signs/symptoms of respiratory failure include- tachypnea, increased work of breathing, use of accessory muscles and wheezing. Contributing diagnoses includes - COPD and Pneumonia Labs and images were reviewed. Patient Has recent ABG, which has been reviewed. Will treat underlying causes and adjust management of respiratory failure as follows:  CT at OSH negative for PE. CXR grossly unremarkable.  US negative for DVTs  Intubated 7/13 for increased work of breathing and worsening alkalosis    --Broad-spectrum antibiotics  --Wean FiO2 for SpO2 >92%  --Maintain lung protective ventilation  --Daily SAT/SBT  --Scheduled nebs  --ABG PRN  --Repeat Echo as first study with difficult windows

## 2022-07-13 NOTE — ASSESSMENT & PLAN NOTE
No concerns for ACS at this time.  Trop peaked at 0.114 on 7/12- likely demand ischemia    --Repeat Echo

## 2022-07-13 NOTE — ASSESSMENT & PLAN NOTE
[place] [facility] [place] with new leukocytosis with anemia/thrombocytopenia.    - BMT following  - peripheral blasts noted >20%  - flow/smear from 7/11 pending  - Hep B/HIV testing sent  - Bone marrow biopsy 7/12; results pending

## 2022-07-13 NOTE — SUBJECTIVE & OBJECTIVE
Interval History/Significant Events: Increased work of breathing noted this morning; appears to be getting worse.    Review of Systems   Unable to perform ROS: Acuity of condition   Musculoskeletal:  Positive for back pain.   Objective:     Vital Signs (Most Recent):  Temp: 100 °F (37.8 °C) (07/13/22 1100)  Pulse: 96 (07/13/22 1532)  Resp: (!) 26 (07/13/22 1532)  BP: (!) 177/73 (07/13/22 1532)  SpO2: 95 % (07/13/22 1532)   Vital Signs (24h Range):  Temp:  [97.7 °F (36.5 °C)-100.1 °F (37.8 °C)] 100 °F (37.8 °C)  Pulse:  [] 96  Resp:  [20-36] 26  SpO2:  [91 %-96 %] 95 %  BP: (119-177)/(58-93) 177/73   Weight: 96.4 kg (212 lb 8.4 oz)  Body mass index is 40.16 kg/m².      Intake/Output Summary (Last 24 hours) at 7/13/2022 1557  Last data filed at 7/13/2022 1300  Gross per 24 hour   Intake 818.54 ml   Output 2080 ml   Net -1261.46 ml       Physical Exam  Constitutional:       General: She is sleeping.      Appearance: She is ill-appearing.   HENT:      Head: Normocephalic and atraumatic.   Cardiovascular:      Rate and Rhythm: Tachycardia present. Rhythm irregular.      Heart sounds: Murmur heard.   Pulmonary:      Effort: Tachypnea and accessory muscle usage present.      Breath sounds: Decreased breath sounds present. No wheezing, rhonchi or rales.      Comments: Bipap 14/6 60%  Abdominal:      General: Abdomen is protuberant.      Palpations: Abdomen is soft.      Tenderness: There is no abdominal tenderness. There is no guarding or rebound.   Musculoskeletal:      Cervical back: Neck supple.   Skin:     Comments: Dusky fingertips   Neurological:      Mental Status: She is easily aroused.      GCS: GCS eye subscore is 4. GCS verbal subscore is 5. GCS motor subscore is 6.      Comments: Unable to complete sentences due to dyspnea       Vents:  Vent Mode: A/C (07/13/22 1532)  Set Rate: 26 BPM (07/13/22 1532)  Vt Set: 340 mL (07/13/22 1532)  PEEP/CPAP: 8 cmH20 (07/13/22 1532)  Oxygen Concentration (%): 80 (07/13/22  1532)  Peak Airway Pressure: 27 cmH2O (07/13/22 1532)  Plateau Pressure: 0 cmH20 (07/13/22 1532)  Total Ve: 9.05 mL (07/13/22 1532)  Negative Inspiratory Force (cm H2O): 0 (07/13/22 1532)  F/VT Ratio<105 (RSBI): (!) 74.5 (07/13/22 1532)  Lines/Drains/Airways       Drain  Duration                  Urethral Catheter 07/11/22 1147 2 days         NG/OG Tube 07/13/22 1518 [place] sump Right mouth <1 day              Airway  Duration                  Airway - Non-Surgical 07/13/22 1503 Endotracheal Tube <1 day              Peripheral Intravenous Line  Duration                  Peripheral IV - Single Lumen 07/10/22 1200 22 G Anterior;Left Hand 3 days         Peripheral IV - Single Lumen 07/11/22 1500 20 G Anterior;Distal;Right Upper Arm 2 days         Midline Catheter Insertion/Assessment  - Single Lumen 07/12/22 1105 Left cephalic vein (lateral side of arm) 20g x 10cm 1 day                  Significant Labs:    CBC/Anemia Profile:  Recent Labs   Lab 07/12/22  2321 07/13/22  0326 07/13/22  1108   WBC 17.84* 15.48* 16.68*   HGB 6.9* 6.3* 7.9*   HCT 20.5* 18.5* 23.5*   PLT 20* 19* 14*   MCV 99* 97 95   RDW 17.8* 17.8* 18.7*        Chemistries:  Recent Labs   Lab 07/11/22  1940 07/12/22  0350 07/13/22  0326   * 129* 126*   K 3.6 4.0 4.5   CL 94* 96 95   CO2 15* 20* 19*   BUN 25* 29* 35*   CREATININE 1.4 1.3 0.8   CALCIUM 8.4* 8.5* 8.0*   ALBUMIN  --  2.5* 2.4*   PROT  --  6.3 6.2   BILITOT  --  0.9 0.9   ALKPHOS  --  83 92   ALT  --  364* 323*   AST  --  417* 219*   MG 1.6 1.6 2.4   PHOS 2.1* 2.7 2.1*       All pertinent labs within the past 24 hours have been reviewed.    Significant Imaging:  I have reviewed all pertinent imaging results/findings within the past 24 hours.

## 2022-07-13 NOTE — ASSESSMENT & PLAN NOTE
No history of A-fib in the past. Episodes of RVR with 's on 7/13; accompanied by increased work of breathing. Stable BP.    --Converted with IV metoprolol

## 2022-07-13 NOTE — PROCEDURES
"Kimmy Bass is a 67 y.o. female patient.    Temp: 100 °F (37.8 °C) (07/13/22 1100)  Pulse: 94 (07/13/22 1600)  Resp: (!) 26 (07/13/22 1600)  BP: (!) 150/63 (07/13/22 1600)  SpO2: (!) 93 % (07/13/22 1600)  Weight: 96.4 kg (212 lb 8.4 oz) (07/13/22 1532)  Height: 5' 1" (154.9 cm) (07/13/22 1532)       Intubation    Date/Time: 7/13/2022 4:27 PM  Location procedure was performed: [facility] CRITICAL CARE MEDICINE  Performed by: Sarah Villanueva MD  Authorized by: Delvin Nunez MD   Assisting provider: Delvin Nunez MD  Pre-operative diagnosis: Respiratory Distress  Post-operative diagnosis: Respiratory Distress  Consent Done: Emergent Situation  Indications: respiratory distress and  respiratory failure  Intubation method: video-assisted  Patient status: paralyzed (RSI)  Preoxygenation: mask  Sedatives: etomidate  Paralytic: rocuronium  Laryngoscope size: Glide 3  Tube size: 7.0 mm  Tube type: cuffed  Number of attempts: 1  Cricoid pressure: no  Cords visualized: yes  Post-procedure assessment: CO2 detector  Breath sounds: equal and absent over the epigastrium  Cuff inflated: yes  ETT to lip: 20 cm  Tube secured with: ETT dolan          7/13/2022  "

## 2022-07-13 NOTE — ASSESSMENT & PLAN NOTE
- WBC 17K on arrival to [facility]  - stable  - continue to monitor with at least daily CBC  - no need for hydrea at this time  - checking cold agglutinin and cryoglobulin given suspected perfusion issues

## 2022-07-13 NOTE — EICU
Intervention Initiated From:  Bedside    Gina Communicated with Bedside Nurse regarding:  Documentation    Comments: Called to bedside for emergent intubation. Time out completed using proper pt identifiers.  Pt on BiPAP with SaO2 96%.  Resp rate tachypneic at 35.  1500 - etomidate 20 mg given IVP  1500 - rocuronium 100 mg given IVP  1503 - Intubated by MD Villanueva under direct supervision of MD Nunez.  Glidescope used.  ETT #7.0 placed.  Color change noted to ETCO2 detector.  Bilat breath sounds auscultated.  Tube secured at 20 cm to lip.  Pt tolerated well.  PCXR noted on order.    1505 - SaO2 97%  1515 - OGT placed. Bubble auscultation performed.  Pt tolerated well.  KUB ordered.

## 2022-07-13 NOTE — ASSESSMENT & PLAN NOTE
Patient noted to have a metabolic acidosis secondary to rising lactate (5.5 to 9.8). Procal 4.66.   Lactate trending down. CT chest/abd/pelvis with no foci of infection.    --Blood and urine cultures pending  --Broad-spectrum antibiotics  --DC Vanc today

## 2022-07-13 NOTE — ASSESSMENT & PLAN NOTE
- possible acute liver injury following respiratory failure/shock  - noted steatosis on CT  - continue to trend, down trending this am  - avoid hepato toxic meds as able

## 2022-07-13 NOTE — SUBJECTIVE & OBJECTIVE
Subjective:     Interval History: on 80% bipap this am. Chest x-ray this am showing bibasilar atelectasis. Received lasix 60 mg this am. Afebrile, infection work-up unrevealing. Lactic down and wnl this am. On cefepime and Zithromax. Bone marrow results pending, WBC 15.4K, 31% others on differential. 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 Uric acid 8.0, allopurinol started. Noted to have blue discoloration of fingertips and toes. Not on pressors, will check cold agglutinin and cryoglobulin.    Objective:     Vital Signs (Most Recent):  Temp: 100 °F (37.8 °C) (07/13/22 1100)  Pulse: 91 (07/13/22 1157)  Resp: (Abnormal) 26 (07/13/22 1157)  BP: 125/70 (07/13/22 1100)  SpO2: (Abnormal) 93 % (07/13/22 1157)   Vital Signs (24h Range):  Temp:  [97.7 °F (36.5 °C)-100.1 °F (37.8 °C)] 100 °F (37.8 °C)  Pulse:  [] 91  Resp:  [20-36] 26  SpO2:  [90 %-99 %] 93 %  BP: (119-156)/(58-93) 125/70     Weight: 96.4 kg (212 lb 8.4 oz)  Body mass index is 40.16 kg/m².  Body surface area is 2.04 meters squared.    ECOG SCORE             Intake/Output - Last 3 Shifts       Intake/Output Category 07/11 0700 to 07/12 0659 07/12 0700 to 07/13 0659 07/13 0700 to 07/14 0659    I.V. (mL/kg)  49.3 (0.5)     Blood  375 232.5    IV Piggyback 1930.2 575.2 69.4    Total Intake(mL/kg) 1930.2 (20) 999.5 (10.4) 301.9 (3.1)    Urine (mL/kg/hr) 440 (0.2) 1185 (0.5) 1125 (2.3)    Total Output 440 1185 1125    Net +1490.2 -185.5 -823.1                   Physical Exam  Vitals and nursing note reviewed.   Constitutional:       Appearance: She is ill-appearing and toxic-appearing.   HENT:      Head: Normocephalic and atraumatic.      Right Ear: External ear normal.      Left Ear: External ear normal.      Mouth/Throat:      Mouth: Mucous membranes are dry.      Pharynx: No posterior oropharyngeal erythema.   Eyes:      Extraocular Movements: Extraocular movements intact.      Conjunctiva/sclera: Conjunctivae normal.   Cardiovascular:       Rate and Rhythm: Normal rate and regular rhythm.      Pulses: Normal pulses.      Heart sounds: Normal heart sounds.   Pulmonary:      Effort: Tachypnea present. No respiratory distress.      Breath sounds: Decreased breath sounds and rales present.      Comments: Bipap in place  Abdominal:      General: Bowel sounds are normal.      Palpations: Abdomen is soft.      Tenderness: There is no abdominal tenderness.   Genitourinary:     Comments: Rosas in place  Musculoskeletal:         General: Normal range of motion.      Right lower leg: Edema present.      Left lower leg: Edema present.   Skin:     General: Skin is warm and dry.      Findings: Bruising present. No erythema or rash.   Neurological:      General: No focal deficit present.      Mental Status: She is oriented to person, place, and time.      Motor: Weakness (generalized) present.       Significant Labs:   CBC:   Recent Labs   Lab 07/12/22  1539 07/12/22  2321 07/13/22  0326 07/13/22  1108   WBC 14.92* 17.84* 15.48* 16.68*   HGB 7.1* 6.9* 6.3* 7.9*   HCT 19.9* 20.5* 18.5* 23.5*   PLT 37* 20* 19*  --    , CMP:   Recent Labs   Lab 07/11/22  1940 07/12/22  0350 07/13/22  0326   * 129* 126*   K 3.6 4.0 4.5   CL 94* 96 95   CO2 15* 20* 19*   * 133* 133*   BUN 25* 29* 35*   CREATININE 1.4 1.3 0.8   CALCIUM 8.4* 8.5* 8.0*   PROT  --  6.3 6.2   ALBUMIN  --  2.5* 2.4*   BILITOT  --  0.9 0.9   ALKPHOS  --  83 92   AST  --  417* 219*   ALT  --  364* 323*   ANIONGAP 18* 13 12   EGFRNONAA 38.6* 42.3* >60.0   , Coagulation:   Recent Labs   Lab 07/12/22  0453 07/13/22  1108   INR 1.4* 1.2   APTT  --  34.6*   , LDH: No results for input(s): LDHCSF, BFSOURCE in the last 48 hours., and Uric Acid   Recent Labs   Lab 07/13/22  1108   URICACID 8.0*       Diagnostic Results:  I have reviewed all pertinent imaging results/findings within the past 24 hours.

## 2022-07-13 NOTE — PROGRESS NOTES
[facility]  Critical Care Medicine  Progress Note    Patient Name: Kimmy Bass  MRN: [id number]  Admission Date: 7/10/2022  Hospital Length of Stay: 3 days  Code Status: Full Code  Attending Provider: Delvin Nunez MD  Primary Care Provider: Faustino Mercer MD   Principal Problem: Acute hypoxemic respiratory failure    Subjective:     HPI:   This is a 66 y/o F PMhx CAD, GERD, HTN, KIRBY w/ CPAP, adenomatous polyp of transverse colon 2018, extensive cancer history in father (brain), mother (bladder), brother (lymphoma) - all  from cancer. Patient presented to OSH due to progressive feelings of malaise, rhinorrhea, epistaxis, pleuretic chest pain / chest tightness, subjective fevers, night sweats over the course of approximately 10 days. Patient had visited walk-in clinic 3 days prior to OSH presentation, left with Rx Augmentin. At that time influenza and COVID tests were negative. Patient continued to have aforementioned symptoms despite antibiotic initiation, and with increasing subjective fevers. She then presented to [facility] for further evaluation as she began to experience symptoms of dyspnea. Patient was mildly hypertensive and afebrile (T max 100.2 per report) on arrival to OSH. Patient dyspneic on arrival, requiring 2-4 L NC to maintain SpO2 mid 90s. Labs remarkable for thrombocytopenia 13, anemia 9.9, elevated lactate 2.29 > 2.46, elevated , fibrinogen 560, D-dimer 7650. RSV, Influenza, COVID negative. CXR demonstrated no acute cardiopulmonary process. CTA chest ordered for concerns for PE given elevated dimer and dyspnea, negative for PE. Heme-onc evaluated, c/f acute leukemia given elevated d-dimer, peripheral smear demonstrating mononuclear cells, transfered to [facility] for further evaluation. Of note, no palpable LN / weight loss.           Hospital/ICU Course:  Admitted to BMT service on  for further evaluation of possible new acute leukemia diagnosis. The morning  after admission to [facility] [place] [facility] on 7/11, patient became acutely tachypneic, hypoxic, with complaints of chest and back pain. Critical care consulted by rapid response for transfer for hypoxemic respiratory failure requiring bipap. Weaned to comfort flow on 7/12; bone marrow biopsy completed. Intubated on 7/13 for increased work of breathing and worsening alkalosis.      Interval History/Significant Events: Increased work of breathing noted this morning; appears to be getting worse.    Review of Systems   Unable to perform ROS: Acuity of condition   Musculoskeletal:  Positive for back pain.   Objective:     Vital Signs (Most Recent):  Temp: 100 °F (37.8 °C) (07/13/22 1100)  Pulse: 96 (07/13/22 1532)  Resp: (!) 26 (07/13/22 1532)  BP: (!) 177/73 (07/13/22 1532)  SpO2: 95 % (07/13/22 1532)   Vital Signs (24h Range):  Temp:  [97.7 °F (36.5 °C)-100.1 °F (37.8 °C)] 100 °F (37.8 °C)  Pulse:  [] 96  Resp:  [20-36] 26  SpO2:  [91 %-96 %] 95 %  BP: (119-177)/(58-93) 177/73   Weight: 96.4 kg (212 lb 8.4 oz)  Body mass index is 40.16 kg/m².      Intake/Output Summary (Last 24 hours) at 7/13/2022 1557  Last data filed at 7/13/2022 1300  Gross per 24 hour   Intake 818.54 ml   Output 2080 ml   Net -1261.46 ml       Physical Exam  Constitutional:       General: She is sleeping.      Appearance: She is ill-appearing.   HENT:      Head: Normocephalic and atraumatic.   Cardiovascular:      Rate and Rhythm: Tachycardia present. Rhythm irregular.      Heart sounds: Murmur heard.   Pulmonary:      Effort: Tachypnea and accessory muscle usage present.      Breath sounds: Decreased breath sounds present. No wheezing, rhonchi or rales.      Comments: Bipap 14/6 60%  Abdominal:      General: Abdomen is protuberant.      Palpations: Abdomen is soft.      Tenderness: There is no abdominal tenderness. There is no guarding or rebound.   Musculoskeletal:      Cervical back: Neck supple.   Skin:     Comments: Dusky fingertips   Neurological:       Mental Status: She is easily aroused.      GCS: GCS eye subscore is 4. GCS verbal subscore is 5. GCS motor subscore is 6.      Comments: Unable to complete sentences due to dyspnea       Vents:  Vent Mode: A/C (07/13/22 1532)  Set Rate: 26 BPM (07/13/22 1532)  Vt Set: 340 mL (07/13/22 1532)  PEEP/CPAP: 8 cmH20 (07/13/22 1532)  Oxygen Concentration (%): 80 (07/13/22 1532)  Peak Airway Pressure: 27 cmH2O (07/13/22 1532)  Plateau Pressure: 0 cmH20 (07/13/22 1532)  Total Ve: 9.05 mL (07/13/22 1532)  Negative Inspiratory Force (cm H2O): 0 (07/13/22 1532)  F/VT Ratio<105 (RSBI): (!) 74.5 (07/13/22 1532)  Lines/Drains/Airways       Drain  Duration                  Urethral Catheter 07/11/22 1147 2 days         NG/OG Tube 07/13/22 1518 Spooner sump Right mouth <1 day              Airway  Duration                  Airway - Non-Surgical 07/13/22 1503 Endotracheal Tube <1 day              Peripheral Intravenous Line  Duration                  Peripheral IV - Single Lumen 07/10/22 1200 22 G Anterior;Left Hand 3 days         Peripheral IV - Single Lumen 07/11/22 1500 20 G Anterior;Distal;Right Upper Arm 2 days         Midline Catheter Insertion/Assessment  - Single Lumen 07/12/22 1105 Left cephalic vein (lateral side of arm) 20g x 10cm 1 day                  Significant Labs:    CBC/Anemia Profile:  Recent Labs   Lab 07/12/22  2321 07/13/22  0326 07/13/22  1108   WBC 17.84* 15.48* 16.68*   HGB 6.9* 6.3* 7.9*   HCT 20.5* 18.5* 23.5*   PLT 20* 19* 14*   MCV 99* 97 95   RDW 17.8* 17.8* 18.7*        Chemistries:  Recent Labs   Lab 07/11/22  1940 07/12/22  0350 07/13/22  0326   * 129* 126*   K 3.6 4.0 4.5   CL 94* 96 95   CO2 15* 20* 19*   BUN 25* 29* 35*   CREATININE 1.4 1.3 0.8   CALCIUM 8.4* 8.5* 8.0*   ALBUMIN  --  2.5* 2.4*   PROT  --  6.3 6.2   BILITOT  --  0.9 0.9   ALKPHOS  --  83 92   ALT  --  364* 323*   AST  --  417* 219*   MG 1.6 1.6 2.4   PHOS 2.1* 2.7 2.1*       All pertinent labs within the past 24 hours have been  reviewed.    Significant Imaging:  I have reviewed all pertinent imaging results/findings within the past 24 hours.      ABG  Recent Labs   Lab 07/13/22  0842   PH 7.555*   PO2 90   PCO2 26.3*   HCO3 23.3*   BE 1     Assessment/Plan:     Pulmonary  * Acute hypoxemic respiratory failure  Patient with Hypoxic Respiratory failure which is Acute.  she is not on home oxygen. Supplemental oxygen was provided and noted- Vent Mode: A/C  Oxygen Concentration (%):  [] 80  Resp Rate Total:  [26 br/min] 26 br/min  Vt Set:  [340 mL] 340 mL  PEEP/CPAP:  [8 cmH20] 8 cmH20  Mean Airway Pressure:  [13 cmH20] 13 cmH20.   Signs/symptoms of respiratory failure include- tachypnea, increased work of breathing, use of accessory muscles and wheezing. Contributing diagnoses includes - COPD and Pneumonia Labs and images were reviewed. Patient Has recent ABG, which has been reviewed. Will treat underlying causes and adjust management of respiratory failure as follows:  CT at OSH negative for PE. CXR grossly unremarkable.  US negative for DVTs  Intubated 7/13 for increased work of breathing and worsening alkalosis    --Broad-spectrum antibiotics  --Wean FiO2 for SpO2 >92%  --Maintain lung protective ventilation  --Daily SAT/SBT  --Scheduled nebs  --ABG PRN  --Repeat Echo as first study with difficult windows      Cardiac/Vascular  Atrial fibrillation with RVR  No history of A-fib in the past. Episodes of RVR with 's on 7/13; accompanied by increased work of breathing. Stable BP.    --Converted with IV metoprolol    HTN (hypertension)  Home meds: HCTZ 25 mg, Toprol XL 50 qd    --Hold in the setting of critical illness with sepsis    CAD S/P percutaneous coronary angioplasty  No concerns for ACS at this time.  Trop peaked at 0.114 on 7/12- likely demand ischemia    --Repeat Echo    ID  Severe sepsis  Patient noted to have a metabolic acidosis secondary to rising lactate (5.5 to 9.8). Procal 4.66.   Lactate trending down. CT  chest/abd/pelvis with no foci of infection.    --Blood and urine cultures pending  --Broad-spectrum antibiotics  --DC Vanc today          Hematology  Thrombocytopenia  Patient presenting with thrombocytopenia 13, anemia Hgb 9.9, which patient's daughter says are new findings (previous CBC done at outside ambulatory clinic were reportedly normal). Patient with chronic bruising predating this episode of illness. Elevated , fibrinogen 560, D-dimer 7650. At OSH, heme onc evaluated and concerns for acute leukemia given peripheral smear review demonstrating mononuclear cells. S/P 1 u PLT.     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    - BMT following  - peripheral blasts noted >20%  - flow/smear from 7/11 pending  - Hep B/HIV testing sent  - Bone marrow biopsy 7/12; results pending    Anemia in neoplastic disease  Maintain current type and screen    --Transfuse if Hgb<7 or bleeding    GI  Transaminitis  Possible liver injury after respiratory failure/shock. Steatosis noted on CT.    --Trend LFTs  --Avoid hepatotoxic medications    Other  KIRBY on CPAP  Bipap QHS when extubated       Critical Care Daily Checklist:    A: Awake: RASS Goal/Actual Goal:    Actual: [place] Agitation Sedation Scale (RASS): Drowsy   B: Spontaneous Breathing Trial Performed?     C: SAT & SBT Coordinated?  Intubated today                      D: Delirium: CAM-ICU Overall CAM-ICU: Negative   E: Early Mobility Performed? No   F: Feeding Goal:    Status:     Current Diet Order   Procedures    Diet NPO      AS: Analgesia/Sedation Propofol/Fentanyl   T: Thromboembolic Prophylaxis Plts 14   H: HOB > 300 Yes   U: Stress Ulcer Prophylaxis (if needed) Pepcid   G: Glucose Control SSI   B: Bowel Function     I: Indwelling Catheter (Lines & Rosas) Necessity PIVs; Rosas  Consider central  line if pressors needed   D: De-escalation of Antimicrobials/Pharmacotherapies DC Vanc  Continues Azithro and Cefepime    Plan for the day/ETD Intubation    Code Status:  Family/Goals of Care: Full Code  Brothers and daughter updated at bedside     Critical Care Time: 50 minutes  Critical secondary to Patient has a condition that poses threat to life and bodily function: Severe Respiratory Distress      Critical care was time spent personally by me on the following activities: development of treatment plan with patient or surrogate and bedside caregivers, discussions with consultants, evaluation of patient's response to treatment, examination of patient, ordering and performing treatments and interventions, ordering and review of laboratory studies, ordering and review of radiographic studies, pulse oximetry, re-evaluation of patient's condition. This critical care time did not overlap with that of any other provider or involve time for any procedures.     Cat Powers, NP  Critical Care Medicine  [facility] - Cardiac Medical ICU

## 2022-07-13 NOTE — ASSESSMENT & PLAN NOTE
- hold ASA with thrombocytopenia  - consider holding atorvastatin with new transaminitis  - No role for medical therapy or further cardiac w/u at this time per critical care

## 2022-07-13 NOTE — PROGRESS NOTES
Pharmacokinetic Sign-off: IV Vancomycin    Therapy with vancomycin complete and/or consult discontinued by provider.  Pharmacy will sign off, please re-consult as needed.    Mayda Pearl, PharmD, BCPS  [phone] [id number]

## 2022-07-13 NOTE — ASSESSMENT & PLAN NOTE
- low grade fevers  - RSV, Influenza, COVID negative.   - CXR demonstrated no acute cardiopulmonary process / focal consolidation.   - OSH CTA chest ordered for concerns for PE given elevated dimer and dyspnea, negative for PE.  - sent to ICU on arrival to BMT unit for worsening resp status  - placed on bipap; now on 80% O2  - BCx x 2 NGTD  - CXR and CT c/a/p unrevealing for resp cause; small bilateral pleural effusions noted  -   - received lasix 60 mg this am (7/13)  - Trend lacate; now wnl this am  - continue cefepime/azithro  - further management per ICU

## 2022-07-13 NOTE — ASSESSMENT & PLAN NOTE
- newly noted leukocytosis with anemia/thrombocytopenia  - peripheral blasts noted >20%  - flow/smear from 7/11 suspicious for AML vs AMML, vs CMML  - Hep B/HIV testing sent, pending  - Echo done 7/11 however poor image due to tachycardia; consider repeating when more stable  - bone marrow biopsy completed 7/12, results pending  - uric acid up to 8.0 today, allopurinol 300 mg daily  - checking cryoglobulin and cold agglutinin today  - on ppx acyclovir and diflucan

## 2022-07-13 NOTE — ASSESSMENT & PLAN NOTE
Home meds: HCTZ 25 mg, Toprol XL 50 qd    - Continue home Toprol as tolerated, HCTZ on hold per critical care

## 2022-07-13 NOTE — PLAN OF CARE
CMICU DAILY GOALS       A: Awake    RASS: Goal -    Actual - RASS ([place] Agitation-Sedation Scale): -3-->moderate sedation   Restraint necessity: Clinical Justification: Treatment Interference  B: Breathe   SBT: Not attempted   C: Coordinate A & B, analgesics/sedatives   Pain: managed    SAT: Not attempted  D: Delirium   CAM-ICU: Overall CAM-ICU: Positive  E: Early(intubated/ Progressive (non-intubated) Mobility   MOVE Screen: Pass   Activity: Activity Management: Rolling - L1  FAS: Feeding/Nutrition   Diet order: Diet/Nutrition Received: NPO,    T: Thrombus   DVT prophylaxis: VTE Required Core Measure: Pharmacological prophylaxis initiated/maintained  H: HOB Elevation   Head of Bed (HOB) Positioning: HOB at 30 degrees  U: Ulcer Prophylaxis   GI: yes  G: Glucose control   managed Glycemic Management: blood glucose monitored  S: Skin   Bathing/Skin Care: dressed/undressed  Device Skin Pressure Protection: absorbent pad utilized/changed, pressure points protected, positioning supports utilized  Pressure Reduction Devices: specialty bed utilized  Pressure Reduction Techniques: frequent weight shift encouraged, weight shift assistance provided, rest period provided between sit times  Skin Protection: adhesive use limited, incontinence pads utilized, tubing/devices free from skin contact  B: Bowel Function   no issues   I: Indwelling Catheters   Rosas necessity:      Urethral Catheter 07/11/22 1147-Reason for Continuing Urinary Catheterization: Critically ill in ICU and requiring hourly monitoring of intake/output   CVC necessity: Yes  D: De-escalation Antibiotics   Yes    Family/Goals of care/Code Status   Code Status: Full Code    24H Vital Sign Range  Temp:  [97.7 °F (36.5 °C)-100.1 °F (37.8 °C)]   Pulse:  []   Resp:  [21-36]   BP: (119-177)/(58-93)   SpO2:  [91 %-98 %]      Shift Events   Pt required increased BiPAP needs throughout the shift. ABG showed worsening hypoxemia and alkalosis. Pt was intubated at  1450 with etomidate and rocuronium; tolerated well. Pt is sedated with propofol and fentanyl, tolerating this well. All VSS.      VS and assessment per flow sheet, patient progressing towards goals as tolerated, plan of care reviewed with  patient and family , all concerns addressed, will continue to monitor.    Felicia Bialon

## 2022-07-13 NOTE — PROGRESS NOTES
[facility] Card[facility]  Hematology  Bone Marrow Transplant  Progress Note    Patient Name: Kimmy Bass  Admission Date: 7/10/2022  Hospital Length of Stay: 3 days  Code Status: Full Code    Subjective:     Interval History: on 80% bipap this am. Chest x-ray this am showing bibasilar atelectasis. Received lasix 60 mg this am. Afebrile, infection work-up unrevealing. Lactic down and wnl this am. On cefepime and Zithromax. Bone marrow results pending, WBC 15.4K, 31% others on differential. 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 Uric acid 8.0, allopurinol started. Noted to have blue discoloration of fingertips and toes. Not on pressors, will check cold agglutinin and cryoglobulin.    Objective:     Vital Signs (Most Recent):  Temp: 100 °F (37.8 °C) (07/13/22 1100)  Pulse: 91 (07/13/22 1157)  Resp: (Abnormal) 26 (07/13/22 1157)  BP: 125/70 (07/13/22 1100)  SpO2: (Abnormal) 93 % (07/13/22 1157)   Vital Signs (24h Range):  Temp:  [97.7 °F (36.5 °C)-100.1 °F (37.8 °C)] 100 °F (37.8 °C)  Pulse:  [] 91  Resp:  [20-36] 26  SpO2:  [90 %-99 %] 93 %  BP: (119-156)/(58-93) 125/70     Weight: 96.4 kg (212 lb 8.4 oz)  Body mass index is 40.16 kg/m².  Body surface area is 2.04 meters squared.    ECOG SCORE             Intake/Output - Last 3 Shifts       Intake/Output Category 07/11 0700 to 07/12 0659 07/12 0700 to 07/13 0659 07/13 0700 to 07/14 0659    I.V. (mL/kg)  49.3 (0.5)     Blood  375 232.5    IV Piggyback 1930.2 575.2 69.4    Total Intake(mL/kg) 1930.2 (20) 999.5 (10.4) 301.9 (3.1)    Urine (mL/kg/hr) 440 (0.2) 1185 (0.5) 1125 (2.3)    Total Output 440 1185 1125    Net +1490.2 -185.5 -823.1                   Physical Exam  Vitals and nursing note reviewed.   Constitutional:       Appearance: She is ill-appearing and toxic-appearing.   HENT:      Head: Normocephalic and atraumatic.      Right Ear: External ear normal.      Left Ear: External ear normal.      Mouth/Throat:      Mouth: Mucous  membranes are dry.      Pharynx: No posterior oropharyngeal erythema.   Eyes:      Extraocular Movements: Extraocular movements intact.      Conjunctiva/sclera: Conjunctivae normal.   Cardiovascular:      Rate and Rhythm: Normal rate and regular rhythm.      Pulses: Normal pulses.      Heart sounds: Normal heart sounds.   Pulmonary:      Effort: Tachypnea present. No respiratory distress.      Breath sounds: Decreased breath sounds and rales present.      Comments: Bipap in place  Abdominal:      General: Bowel sounds are normal.      Palpations: Abdomen is soft.      Tenderness: There is no abdominal tenderness.   Genitourinary:     Comments: Rosas in place  Musculoskeletal:         General: Normal range of motion.      Right lower leg: Edema present.      Left lower leg: Edema present.   Skin:     General: Skin is warm and dry.      Findings: Bruising present. No erythema or rash.   Neurological:      General: No focal deficit present.      Mental Status: She is oriented to person, place, and time.      Motor: Weakness (generalized) present.       Significant Labs:   CBC:   Recent Labs   Lab 07/12/22  1539 07/12/22  2321 07/13/22  0326 07/13/22  1108   WBC 14.92* 17.84* 15.48* 16.68*   HGB 7.1* 6.9* 6.3* 7.9*   HCT 19.9* 20.5* 18.5* 23.5*   PLT 37* 20* 19*  --    , CMP:   Recent Labs   Lab 07/11/22  1940 07/12/22  0350 07/13/22  0326   * 129* 126*   K 3.6 4.0 4.5   CL 94* 96 95   CO2 15* 20* 19*   * 133* 133*   BUN 25* 29* 35*   CREATININE 1.4 1.3 0.8   CALCIUM 8.4* 8.5* 8.0*   PROT  --  6.3 6.2   ALBUMIN  --  2.5* 2.4*   BILITOT  --  0.9 0.9   ALKPHOS  --  83 92   AST  --  417* 219*   ALT  --  364* 323*   ANIONGAP 18* 13 12   EGFRNONAA 38.6* 42.3* >60.0   , Coagulation:   Recent Labs   Lab 07/12/22  0453 07/13/22  1108   INR 1.4* 1.2   APTT  --  34.6*   , LDH: No results for input(s): LDHCSF, BFSOURCE in the last 48 hours., and Uric Acid   Recent Labs   Lab 07/13/22  1108   URICACID 8.0*        Diagnostic Results:  I have reviewed all pertinent imaging results/findings within the past 24 hours.    Assessment/Plan:     * Acute hypoxemic respiratory failure  - low grade fevers  - RSV, Influenza, COVID negative.   - CXR demonstrated no acute cardiopulmonary process / focal consolidation.   - OSH CTA chest ordered for concerns for PE given elevated dimer and dyspnea, negative for PE.  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  - newly noted leukocytosis with anemia/thrombocytopenia  - peripheral blasts noted >20%  - flow/smear from 7/11 suspicious for AML vs AMML, vs CMML  - Hep B/HIV testing sent, pending  - Echo done 7/11 however poor image due to tachycardia; consider repeating when more stable  - bone marrow biopsy completed 7/12, results pending  - uric acid up to 8.0 today, allopurinol 300 mg daily  - checking cryoglobulin and cold agglutinin today  - on ppx acyclovir and diflucan    Leukocytosis  - [place] 17K [place] [facility] [place]  - stable  - continue to monitor with at least daily CBC  - no need for hydrea at this time  - checking cold agglutinin and cryoglobulin given suspected perfusion issues    Anemia in neoplastic disease  - monitor at least daily CBC  - transfuse for Hgb <7 or can maintain >8 with any symptoms of ACS/demand needs    Thrombocytopenia  - monitor at least daily CBC  - Transfuse if PLT < 10K or bleeding    Transaminitis  - possible acute liver injury following respiratory failure/shock  - noted steatosis on CT  - continue to trend, down trending this am  - avoid hepato toxic meds as  able    Hypophosphatemia  -electrolyte replacement per critical care    HTN (hypertension)  Home meds: HCTZ 25 mg, Toprol XL 50 qd    - Continue home Toprol as tolerated, HCTZ on hold per critical care    HLD (hyperlipidemia)  - on atorvastatin at home  - holding given new transaminitis    CAD S/P percutaneous coronary angioplasty  - hold ASA with thrombocytopenia  - consider holding atorvastatin with new transaminitis  - No role for medical therapy or further cardiac w/u at this time per critical care    KIRBY on CPAP  - CPAP at home  - currently on bipap for hypoxemia    GERD (gastroesophageal reflux disease)  - continue home daily Pepcid       VTE Risk Mitigation (From admission, onward)         Ordered     IP VTE LOW RISK PATIENT  Once         07/11/22 0027     Place sequential compression device  Until discontinued         07/11/22 0027                Disposition: remains in ICU, step down to BMT service when stable     Essence Aponte NP  Bone Marrow Transplant  [facility] - Cardiac Medical ICU

## 2022-07-14 PROBLEM — N17.9 AKI (ACUTE KIDNEY INJURY): Status: ACTIVE | Noted: 2022-01-01

## 2022-07-14 PROBLEM — E16.2 HYPOGLYCEMIA: Status: ACTIVE | Noted: 2022-01-01

## 2022-07-14 NOTE — SUBJECTIVE & OBJECTIVE
Subjective:     Interval History: intubated yesterday evening, sedated. FiO2 at 60%. Hypotensive overnight and Norepi started, stopped this am. T.max 102.4 at 11pm. BC obtained this am. On Cefepime and Vanc restarted. Creatinine up to 1.8 with low urine output overnight. Uric acid up to 9.2, allopurinol increased to bid. 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    Objective:     Vital Signs (Most Recent):  Temp: (Abnormal) 100.6 °F (38.1 °C) (07/14/22 0800)  Pulse: 99 (07/14/22 1306)  Resp: (Abnormal) 30 (07/14/22 1306)  BP: (Abnormal) 100/55 (07/14/22 1306)  SpO2: (Abnormal) 94 % (07/14/22 1306)   Vital Signs (24h Range):  Temp:  [98.3 °F (36.8 °C)-102.4 °F (39.1 °C)] 100.6 °F (38.1 °C)  Pulse:  [] 99  Resp:  [24-33] 30  SpO2:  [90 %-100 %] 94 %  BP: ()/(51-76) 100/55  Arterial Line BP: (111)/(48) 111/48     Weight: 96.4 kg (212 lb 8.4 oz)  Body mass index is 40.16 kg/m².  Body surface area is 2.04 meters squared.        Intake/Output - Last 3 Shifts       Intake/Output Category 07/12 0700 to 07/13 0659 07/13 0700 to 07/14 0659 07/14 0700 to 07/15 0659    I.V. (mL/kg) 49.3 (0.5) 356.6 (3.7) 194.9 (2)    Blood 375 232.5     NG/GT  90     IV Piggyback 575.2 435.5     Total Intake(mL/kg) 999.5 (10.4) 1114.5 (11.6) 194.9 (2)    Urine (mL/kg/hr) 1185 (0.5) 1654 (0.7) 20 (0)    Stool  0     Total Output 1185 1654 20    Net -185.5 -539.5 +174.9           Stool Occurrence  0 x             Physical Exam  Vitals and nursing note reviewed.   Constitutional:       Appearance: She is ill-appearing and toxic-appearing.      Interventions: She is sedated and intubated.   HENT:      Head: Normocephalic and atraumatic.      Right Ear: External ear normal.      Left Ear: External ear normal.      Mouth/Throat:      Mouth: Mucous membranes are dry.      Pharynx: No posterior oropharyngeal erythema.   Eyes:      Extraocular Movements: Extraocular movements  intact.      Conjunctiva/sclera: Conjunctivae normal.   Cardiovascular:      Rate and Rhythm: Normal rate and regular rhythm.      Pulses: Normal pulses.      Heart sounds: Normal heart sounds.   Pulmonary:      Effort: Tachypnea present. No respiratory distress. She is intubated.      Breath sounds: Decreased breath sounds and rales present.      Comments: intubated  Abdominal:      General: Bowel sounds are normal.      Palpations: Abdomen is soft.      Tenderness: There is no abdominal tenderness.   Genitourinary:     Comments: Rosas in place  Musculoskeletal:         General: Swelling (generalized) present. Normal range of motion.      Right lower leg: Edema present.      Left lower leg: Edema present.   Skin:     General: Skin is warm and dry.      Coloration: Skin is cyanotic (fingertips and toes).      Findings: Bruising present. No erythema or rash.   Neurological:      General: No focal deficit present.      Mental Status: She is oriented to person, place, and time.      GCS: GCS eye subscore is 2. GCS verbal subscore is 1. GCS motor subscore is 5.      Motor: Weakness (generalized) present.       Significant Labs:   CBC:   Recent Labs   Lab 07/13/22  1755 07/13/22  2342 07/14/22  0618 07/14/22  1204   WBC 21.28* 21.00* 25.82* 26.37*   HGB 7.9* 8.2* 7.3* 6.8*   HCT 23.3* 23.0* 20.9* 19.9*   PLT 15* 22* 28*  --    , CMP:   Recent Labs   Lab 07/13/22  0326 07/14/22  0341   * 129*   K 4.5 3.8   CL 95 97   CO2 19* 19*   * 116*   BUN 35* 41*   CREATININE 0.8 1.8*   CALCIUM 8.0* 8.0*   PROT 6.2 6.3   ALBUMIN 2.4* 2.2*   BILITOT 0.9 1.6*   ALKPHOS 92 123   * 160*   * 280*   ANIONGAP 12 13   EGFRNONAA >60.0 28.5*   , Coagulation:   Recent Labs   Lab 07/13/22  1108 07/14/22  0339   INR 1.2 1.3*   APTT 34.6* 29.6   , LDH: No results for input(s): LDHCSF, BFSOURCE in the last 48 hours., and Uric Acid   Recent Labs   Lab 07/13/22  1108 07/14/22  0341   URICACID 8.0* 9.2*       Diagnostic  Results:  I have reviewed all pertinent imaging results/findings within the past 24 hours.

## 2022-07-14 NOTE — ASSESSMENT & PLAN NOTE
- probable acute liver injury following respiratory failure/shock  - noted steatosis on CT  - continue to trend, stable this am  - avoid hepato toxic meds as able

## 2022-07-14 NOTE — ASSESSMENT & PLAN NOTE
- WBC 17K on arrival to [facility], WBC up to 26k this am  - stable  - continue to monitor with at least daily CBC  - consider hydrea if WBC continues to rise  - checking cold agglutinin and cryoglobulin given suspected perfusion issues

## 2022-07-14 NOTE — PROGRESS NOTES
[facility] - Cardiac Medical ICU  Critical Care Medicine  Progress Note    Patient Name: Kimmy Bass  MRN: [id number]  Admission Date: 7/10/2022  Hospital Length of Stay: 4 days  Code Status: Full Code  Attending Provider: Delvin Nunez MD  Primary Care Provider: Faustino Mercer MD   Principal Problem: Acute hypoxemic respiratory failure    Subjective:     HPI:   This is a 66 y/o F PMhx CAD, GERD, HTN, KIRBY w/ CPAP, adenomatous polyp of transverse colon 2018, extensive cancer history in father (brain), mother (bladder), brother (lymphoma) - all  from cancer. Patient presented to OSH due to progressive feelings of malaise, rhinorrhea, epistaxis, pleuretic chest pain / chest tightness, subjective fevers, night sweats over the course of approximately 10 days. Patient had visited walk-in clinic 3 days prior to OSH presentation, left with Rx Augmentin. At that time influenza and COVID tests were negative. Patient continued to have aforementioned symptoms despite antibiotic initiation, and with increasing subjective fevers. She then presented to [facility] for further evaluation as she began to experience symptoms of dyspnea. Patient was mildly hypertensive and afebrile (T max 100.2 per report) on arrival to OSH. Patient dyspneic on arrival, requiring 2-4 L NC to maintain SpO2 mid 90s. Labs remarkable for thrombocytopenia 13, anemia 9.9, elevated lactate 2.29 > 2.46, elevated , fibrinogen 560, D-dimer 7650. RSV, Influenza, COVID negative. CXR demonstrated no acute cardiopulmonary process. CTA chest ordered for concerns for PE given elevated dimer and dyspnea, negative for PE. Heme-onc evaluated, c/f acute leukemia given elevated d-dimer, peripheral smear demonstrating mononuclear cells, transfered to [facility] for further evaluation. Of note, no palpable LN / weight loss.           Hospital/ICU Course:  Admitted to BMT service on  for further evaluation of possible new acute leukemia diagnosis. The morning  after [place] [facility] [place], patient became acutely tachypneic, hypoxic, with complaints of chest and back pain. Critical care consulted by rapid response for transfer for hypoxemic respiratory failure requiring bipap. Weaned to comfort flow on 7/12; bone marrow biopsy completed. Intubated on 7/13 for increased work of breathing and worsening alkalosis.      Interval History/Significant Events: Intubated yesterday. Fingers are more cyanotic.    Review of Systems   Unable to perform ROS: Intubated   Objective:     Vital Signs (Most Recent):  Temp: (!) 100.6 °F (38.1 °C) (07/14/22 0800)  Pulse: 102 (07/14/22 1200)  Resp: (!) 30 (07/14/22 1200)  BP: (!) 100/54 (07/14/22 1200)  SpO2: (!) 94 % (07/14/22 1200)   Vital Signs (24h Range):  Temp:  [98.3 °F (36.8 °C)-102.4 °F (39.1 °C)] 100.6 °F (38.1 °C)  Pulse:  [] 102  Resp:  [23-33] 30  SpO2:  [90 %-100 %] 94 %  BP: ()/(51-76) 100/54  Arterial Line BP: (111)/(48) 111/48   Weight: 96.4 kg (212 lb 8.4 oz)  Body mass index is 40.16 kg/m².      Intake/Output Summary (Last 24 hours) at 7/14/2022 1227  Last data filed at 7/14/2022 1200  Gross per 24 hour   Intake 740.81 ml   Output 549 ml   Net 191.81 ml       Physical Exam  Constitutional:       Appearance: She is ill-appearing.      Interventions: She is sedated and intubated.   HENT:      Head: Normocephalic and atraumatic.   Cardiovascular:      Rate and Rhythm: Normal rate. Rhythm irregular.      Heart sounds: Murmur heard.   Pulmonary:      Effort: She is intubated.      Breath sounds: Decreased breath sounds present. No wheezing, rhonchi or rales.   Abdominal:      General: Abdomen is protuberant.      Palpations: Abdomen is soft.      Tenderness: There is no abdominal tenderness. There is no guarding or rebound.   Musculoskeletal:      Cervical back: Neck supple.   Skin:     Comments: Dusky fingertips   Neurological:      GCS: GCS eye subscore is 2. GCS verbal subscore is 1. GCS motor subscore is 4.        Vents:  Vent Mode: A/C (07/14/22 1100)  Set Rate: 26 BPM (07/14/22 1100)  Vt Set: 340 mL (07/14/22 1100)  PEEP/CPAP: 8 cmH20 (07/14/22 1100)  Oxygen Concentration (%): 70 (07/14/22 1200)  Peak Airway Pressure: 28 cmH2O (07/14/22 1100)  Plateau Pressure: 20 cmH20 (07/14/22 1100)  Total Ve: 10.9 mL (07/14/22 1100)  Negative Inspiratory Force (cm H2O): 0 (07/14/22 1100)  F/VT Ratio<105 (RSBI): 186.21 (07/14/22 1100)  Lines/Drains/Airways       Drain  Duration                  Urethral Catheter 07/11/22 1147 3 days         NG/OG Tube 07/13/22 1518 [place] sump Right mouth <1 day              Airway  Duration                  Airway - Non-Surgical 07/13/22 1503 Endotracheal Tube <1 day              Arterial Line  Duration             Arterial Line 07/14/22 1135 Right Radial <1 day              Peripheral Intravenous Line  Duration                  Midline Catheter Insertion/Assessment  - Single Lumen 07/12/22 1105 Left cephalic vein (lateral side of arm) 20g x 10cm 2 days         Peripheral IV - Single Lumen 07/11/22 1500 20 G Anterior;Distal;Right Upper Arm 2 days         Peripheral IV - Single Lumen 07/14/22 0947 20 G;1 3/4 in Left Forearm <1 day                  Significant Labs:    CBC/Anemia Profile:  Recent Labs   Lab 07/13/22  1755 07/13/22  2342 07/14/22  0618   WBC 21.28* 21.00* 25.82*   HGB 7.9* 8.2* 7.3*   HCT 23.3* 23.0* 20.9*   PLT 15* 22* 28*   MCV 94 91 93   RDW 19.4* 19.6* 20.7*        Chemistries:  Recent Labs   Lab 07/13/22  0326 07/14/22  0341   * 129*   K 4.5 3.8   CL 95 97   CO2 19* 19*   BUN 35* 41*   CREATININE 0.8 1.8*   CALCIUM 8.0* 8.0*   ALBUMIN 2.4* 2.2*   PROT 6.2 6.3   BILITOT 0.9 1.6*   ALKPHOS 92 123   * 280*   * 160*   MG 2.4 2.4   PHOS 2.1* 2.7       All pertinent labs within the past 24 hours have been reviewed.    Significant Imaging:  I have reviewed all pertinent imaging results/findings within the past 24 hours.      AB  Recent Labs   Lab 07/13/22  1163   PH  7.422   PO2 103*   PCO2 36.1   HCO3 23.5*   BE -1     Assessment/Plan:     Pulmonary  * Acute hypoxemic respiratory failure  Patient with Hypoxic Respiratory failure which is Acute.  she is not on home oxygen. Supplemental oxygen was provided and noted- Vent Mode: A/C  Oxygen Concentration (%):  [] 70  Resp Rate Total:  [0 br/min-34 br/min] 32 br/min  Vt Set:  [340 mL] 340 mL  PEEP/CPAP:  [8 cmH20] 8 cmH20  Mean Airway Pressure:  [13 kaY84-97 cmH20] 17 cmH20.   Signs/symptoms of respiratory failure include- tachypnea, increased work of breathing, use of accessory muscles and wheezing. Contributing diagnoses includes - COPD and Pneumonia Labs and images were reviewed. Patient Has recent ABG, which has been reviewed. Will treat underlying causes and adjust management of respiratory failure as follows:  CT at OSH negative for PE. CXR grossly unremarkable.  US negative for DVTs  Intubated 7/13 for increased work of breathing and worsening alkalosis  Echo 7/13 with EF 63%    --Broad-spectrum antibiotics  --Wean FiO2 for SpO2 >92%  --Maintain lung protective ventilation  --Daily SAT/SBT  --Scheduled nebs  --ABG PRN        Cardiac/Vascular  Atrial fibrillation with RVR  No history of A-fib in the past. Episodes of RVR with 's on 7/13; accompanied by increased work of breathing. Stable BP.    --Converted with IV metoprolol    HTN (hypertension)  Home meds: HCTZ 25 mg, Toprol XL 50 qd    --Hold in the setting of critical illness with sepsis    CAD S/P percutaneous coronary angioplasty  No concerns for ACS at this time.  Trop peaked at 0.114 on 7/12- likely demand ischemia    --Repeat Echo    Renal/  RAMAN (acute kidney injury)  Creatinine elevated 7/14: 1.8. Maintaining UOP    --No more diuresis  --Strict I&O  --Avoid nephrotoxic medications  --Renally dose medications    ID  Severe sepsis  Patient noted to have a metabolic acidosis secondary to rising lactate (5.5 to 9.8). Procal 4.66.   Lactate trending down.  CT chest/abd/pelvis with no foci of infection.    --Blood and urine cultures pending  --Broad-spectrum antibiotics  --Fluconazole and acyclovir per BMT recs          Hematology  Thrombocytopenia  Patient presenting with thrombocytopenia 13, anemia Hgb 9.9, which patient's daughter says are new findings (previous CBC done at outside ambulatory clinic were reportedly normal). Patient with chronic bruising predating this episode of illness. Elevated , fibrinogen 560, D-dimer 7650. At OSH, heme onc evaluated and concerns for acute leukemia given peripheral smear review demonstrating mononuclear cells. S/P 1 u PLT.     - Type and screen  - Transfuse if PLT < 10 or if bleeding    Oncology  Acute leukemia not having achieved remission  Patient transferred to [facility] for BMT eval with new leukocytosis with anemia/thrombocytopenia.    - BMT following  - TUCKER and Mycoplasma sent 7/14  - Bone marrow biopsy 7/12; results pending    Anemia in neoplastic disease  Maintain current type and screen    --Transfuse if Hgb<7 or bleeding    Endocrine  Hypoglycemia  Glucose <20 today; D10 given per protocol    --Start tube feeds  --Maintain hypoglycemia protocol    GI  Transaminitis  Possible liver injury after respiratory failure/shock. Steatosis noted on CT.    --Trend LFTs  --Avoid hepatotoxic medications    Other  KIRBY on CPAP  Bipap QHS when extubated       Critical Care Daily Checklist:    A: Awake: RASS Goal/Actual Goal:    Actual: [place] Agitation Sedation Scale (RASS): Deep sedation   B: Spontaneous Breathing Trial Performed?     C: SAT & SBT Coordinated?  Unable to tolerate                      D: Delirium: CAM-ICU Overall CAM-ICU: Positive   E: Early Mobility Performed? No   F: Feeding Goal: Goals: Meet % EEN, EPN by RD f/u date  Status: Nutrition Goal Status: new   Current Diet Order   Procedures    Diet NPO      AS: Analgesia/Sedation Propofol; Fentanyl   T: Thromboembolic Prophylaxis Hold   H: HOB > 300 Yes   U:  Stress Ulcer Prophylaxis (if needed) Pepcid   G: Glucose Control Start tube feeds for hypoglycemia   B: Bowel Function Stool Occurrence: 0   I: Indwelling Catheter (Lines & Rosas) Necessity New art line today  Midline  Rosas   D: De-escalation of Antimicrobials/Pharmacotherapies Resume Vanc with new fevers  Cefepime; Fluconazole    Plan for the day/ETD Art line; Wean Vent  Continue Onc workup    Code Status:  Family/Goals of Care: Full Code  Daughter at bedside     Critical Care Time: 45 minutes  Critical secondary to Patient has a condition that poses threat to life and bodily function: Severe Respiratory Distress      Critical care was time spent personally by me on the following activities: development of treatment plan with patient or surrogate and bedside caregivers, discussions with consultants, evaluation of patient's response to treatment, examination of patient, ordering and performing treatments and interventions, ordering and review of laboratory studies, ordering and review of radiographic studies, pulse oximetry, re-evaluation of patient's condition. This critical care time did not overlap with that of any other provider or involve time for any procedures.     Cat Powers NP  Critical Care Medicine  [facility] - Cardiac Medical ICU

## 2022-07-14 NOTE — PROGRESS NOTES
Pharmacokinetic Initial Assessment: IV Vancomycin    Assessment/Plan:    Initiate intravenous vancomycin with loading dose of 1000 mg once with subsequent doses when random concentrations are less than 20 mcg/mL  Desired empiric serum trough concentration is 10 to 20 mcg/mL  Draw vancomycin random level on 7/15 at 0300 with AM labs.  Pharmacy will continue to follow and monitor vancomycin.      Please contact pharmacy at extension [phone] with any questions regarding this assessment.     Thank you for the consult,   Maydaviral Pearl       Patient brief summary:  Kimmy Bass is a 67 y.o. female initiated on antimicrobial therapy with IV Vancomycin for treatment of suspected bacteremia    Drug Allergies:   Review of patient's allergies indicates:   Allergen Reactions    Codeine     Guaifenesin     Opioids - morphine analogues     Zanaflex [tizanidine]        Actual Body Weight:   96.4 kg    Renal Function:   Estimated Creatinine Clearance: 31.7 mL/min (A) (based on SCr of 1.8 mg/dL (H)).,     Dialysis Method (if applicable):  N/A    CBC (last 72 hours):  Recent Labs   Lab Result Units 07/11/22  1940 07/12/22  0752 07/12/22  1539 07/12/22  2321 07/13/22  0326 07/13/22  1108 07/13/22  1755 07/13/22  2342 07/14/22  0618 07/14/22  1204   WBC K/uL 23.78* 18.19* 14.92* 17.84* 15.48* 16.68* 21.28* 21.00* 25.82* 26.37*   Hemoglobin g/dL 8.1* 7.0* 7.1* 6.9* 6.3* 7.9* 7.9* 8.2* 7.3* 6.8*   Hematocrit % 23.8* 20.4* 19.9* 20.5* 18.5* 23.5* 23.3* 23.0* 20.9* 19.9*   Platelets K/uL 20* 13* 37* 20* 19* 14* 15* 22* 28* 20*   Gran % % 33.0* 32.0* 46.0 34.0* 34.0* 51.0 56.0 39.0 13.0* 18.0*   Lymph % % 26.0 21.0 13.0* 19.0 14.0* 18.0 9.0* 18.0 25.0 10.0*   Mono % % 9.0 9.0 14.0 7.0 5.0 17.0* 6.0 10.0 12.0 7.0   Eosinophil % % 0.0 0.0 0.0 0.0 0.0 0.0 0.0 0.0 0.0 0.0   Basophil % % 0.0 0.0 0.0 0.0 0.0 0.0 0.0 0.0 0.0 0.0   Differential Method  Manual Manual Manual Manual Manual Manual Manual Manual Manual Manual       Metabolic Panel  (last 72 hours):  Recent Labs   Lab Result Units 07/11/22  1940 07/12/22  0350 07/13/22  0326 07/14/22  0341   Sodium mmol/L 127* 129* 126* 129*   Potassium mmol/L 3.6 4.0 4.5 3.8   Chloride mmol/L 94* 96 95 97   CO2 mmol/L 15* 20* 19* 19*   Glucose mg/dL 139* 133* 133* 116*   BUN mg/dL 25* 29* 35* 41*   Creatinine mg/dL 1.4 1.3 0.8 1.8*   Albumin g/dL  --  2.5* 2.4* 2.2*   Total Bilirubin mg/dL  --  0.9 0.9 1.6*   Alkaline Phosphatase U/L  --  83 92 123   AST U/L  --  417* 219* 160*   ALT U/L  --  364* 323* 280*   Magnesium mg/dL 1.6 1.6 2.4 2.4   Phosphorus mg/dL 2.1* 2.7 2.1* 2.7       Drug levels (last 3 results):  Recent Labs   Lab Result Units 07/12/22  1344 07/14/22  0758   Vancomycin, Random ug/mL 9.4 8.3       Microbiologic Results:  Microbiology Results (last 7 days)     Procedure Component Value Units Date/Time    Blood culture [[id number]] Collected: 07/11/22 0624    Order Status: Completed Specimen: Blood Updated: 07/14/22 0812     Blood Culture, Routine No Growth to date      No Growth to date      No Growth to date      No Growth to date    Blood culture [[id number]] Collected: 07/11/22 0624    Order Status: Completed Specimen: Blood Updated: 07/14/22 0812     Blood Culture, Routine No Growth to date      No Growth to date      No Growth to date      No Growth to date    Culture, Respiratory with Gram Stain [[id number]]     Order Status: No result Specimen: Respiratory from Endotracheal Aspirate     Blood culture [[id number]]     Order Status: No result Specimen: Blood     Blood culture [[id number]]     Order Status: No result Specimen: Blood     Urine Culture High Risk [[id number]] Collected: 07/11/22 0953    Order Status: Completed Specimen: Urine, Catheterized Updated: 07/12/22 1420     Urine Culture, Routine No growth    Narrative:      Indicated criteria for high risk culture:->Other  Other (specify):->sepsis    Respiratory Infection Panel (PCR), Nasopharyngeal [[id number]] Collected: 07/11/22 1210     Order Status: Completed Specimen: Nasopharyngeal Swab Updated: 07/11/22 1413     Respiratory Infection Panel Source NP Swab     Adenovirus Not Detected     Coronavirus 229E, Common Cold Virus Not Detected     Coronavirus HKU1, Common Cold Virus Not Detected     Coronavirus NL63, Common Cold Virus Not Detected     Coronavirus OC43, Common Cold Virus Not Detected     Comment: The Coronavirus strains detected in this test cause the common cold.  These strains are not the COVID-19 (novel Coronavirus)strain   associated with the respiratory disease outbreak.          SARS-CoV2 (COVID-19) Qualitative PCR Not Detected     Human Metapneumovirus Not Detected     Human Rhinovirus/Enterovirus Not Detected     Influenza A (subtypes H1, H1-2009,H3) Not Detected     Influenza B Not Detected     Parainfluenza Virus 1 Not Detected     Parainfluenza Virus 2 Not Detected     Parainfluenza Virus 3 Not Detected     Parainfluenza Virus 4 Not Detected     Respiratory Syncytial Virus Not Detected     Bordetella Parapertussis ([id number]) Not Detected     Bordetella pertussis (ptxP) Not Detected     Chlamydia pneumoniae Not Detected     Mycoplasma pneumoniae Not Detected    Narrative:      For all other respiratory sources, order [id number] -  Respiratory Viral Panel by PCR

## 2022-07-14 NOTE — ASSESSMENT & PLAN NOTE
Patient with Hypoxic Respiratory failure which is Acute.  she is not on home oxygen. Supplemental oxygen was provided and noted- Vent Mode: A/C  Oxygen Concentration (%):  [] 70  Resp Rate Total:  [0 br/min-34 br/min] 32 br/min  Vt Set:  [340 mL] 340 mL  PEEP/CPAP:  [8 cmH20] 8 cmH20  Mean Airway Pressure:  [13 wyZ06-81 cmH20] 17 cmH20.   Signs/symptoms of respiratory failure include- tachypnea, increased work of breathing, use of accessory muscles and wheezing. Contributing diagnoses includes - COPD and Pneumonia Labs and images were reviewed. Patient Has recent ABG, which has been reviewed. Will treat underlying causes and adjust management of respiratory failure as follows:  CT at OSH negative for PE. CXR grossly unremarkable.  US negative for DVTs  Intubated 7/13 for increased work of breathing and worsening alkalosis  Echo 7/13 with EF 63%    --Broad-spectrum antibiotics  --Wean FiO2 for SpO2 >92%  --Maintain lung protective ventilation  --Daily SAT/SBT  --Scheduled nebs  --ABG PRN

## 2022-07-14 NOTE — ASSESSMENT & PLAN NOTE
- creatinine 1.8 this am with low urine output overnight  - possible result of medication (contrast x 2 for CT scan, Vancomycin)  - also possible component of TLS, uric acid 9.2 this am. Allopurinol increased to bid  - managed by critical care

## 2022-07-14 NOTE — ASSESSMENT & PLAN NOTE
Patient noted to have a metabolic acidosis secondary to rising lactate (5.5 to 9.8). Procal 4.66.   Lactate trending down. CT chest/abd/pelvis with no foci of infection.    --Blood and urine cultures pending  --Broad-spectrum antibiotics  --Fluconazole and acyclovir per BMT recs

## 2022-07-14 NOTE — CONSULTS
"  [facility] - [facility]  Adult Nutrition  Consult Note    SUMMARY     Recommendations    1. If/when medically able, initiate enteral nutrition. Rec'd Peptamen Intense VHP @ 50 mL/hr to provide 1200 kcals, 110 g of protein, 1008 mL fluid.  2. RD to monitor & follow-up.    Goals: Meet % EEN, EPN by RD f/u date  Nutrition Goal Status: new  Communication of RD Recs: reviewed with RN    Assessment and Plan    Nutrition Problem:  Inadequate energy intake    Related to (etiology):   Inability to consume sufficient energy    Signs and Symptoms (as evidenced by):   NPO    Interventions(treatment strategy):  Collaboration of nutrition care w/ other providers  EN    Nutrition Diagnosis Status:   New    Reason for Assessment    Reason For Assessment: consult  Diagnosis: other (see comments) (Resp. fx)  Relevant Medical History: HTN, KIRBY  Interdisciplinary Rounds: attended    General Information Comments: Intubated (yesterday), sedated w/ no family at bedside. Unsure of PO intake PTA/UBW (no wt hx in chart). Appears nourished w/ no indicators of malnutrition. Will monitor.  Nutrition Discharge Planning: Pending clinical course    Nutrition/Diet History    Factors Affecting Nutritional Intake: on mechanical ventilation, NPO    Anthropometrics    Temp: (!) 100.6 °F (38.1 °C)  Height Method: Stated  Height: 5' 1" (154.9 cm)  Height (inches): 61 in  Weight Method: Standard Scale  Weight: 96.4 kg (212 lb 8.4 oz)  Weight (lb): 212.53 lb  Ideal Body Weight (IBW), Female: 105 lb  % Ideal Body Weight, Female (lb): 202.41 %  BMI (Calculated): 40.2  BMI Grade: greater than 40 - morbid obesity    Lab/Procedures/Meds    Pertinent Labs Reviewed: reviewed  Pertinent Labs Comments: Na 129, BUN 41, Creat 1.8, GFR 28.5, A1C 6.4  Pertinent Medications Reviewed: reviewed  Pertinent Medications Comments: Fentanyl, Levophed, Propofol    Estimated/Assessed Needs    Weight Used For Calorie Calculations: 96.4 kg (212 lb 8.4 oz)     Energy " Calorie Requirements (kcal): 3272-0126 kcal/d  Energy Need Method: Kcal/kg (30-35 kcal/kg, BMI > 40 + vent)     Protein Requirements:  g/d (2-2.5 g/kg IBW)  Weight Used For Protein Calculations: 47.7 kg (105 lb 2.6 oz)     Estimated Fluid Requirement Method: other (see comments) (Per MD or 1 mL/kcal)  RDA Method (mL): 1060    Nutrition Prescription Ordered    Current Diet Order: NPO    Evaluation of Received Nutrient/Fluid Intake    Other Calories (kcal): 459 (Propofol)    I/O: +8.4L since admit    Comments: LBM: 7/12    Nutrition Risk    Level of Risk/Frequency of Follow-up:  (1x/week)     Monitor and Evaluation    Food and Nutrient Intake: energy intake, food and beverage intake, enteral nutrition intake  Food and Nutrient Adminstration: diet order, enteral and parenteral nutrition administration  Physical Activity and Function: nutrition-related ADLs and IADLs  Anthropometric Measurements: weight, weight change  Biochemical Data, Medical Tests and Procedures: inflammatory profile, lipid profile, glucose/endocrine profile, gastrointestinal profile, electrolyte and renal panel  Nutrition-Focused Physical Findings: overall appearance     Nutrition Follow-Up    RD Follow-up?: Yes

## 2022-07-14 NOTE — PLAN OF CARE
CMICU DAILY GOALS       A: Awake    RASS: Goal -    Actual - RASS ([place] Agitation-Sedation Scale): -4-->deep sedation   Restraint necessity: Clinical Justification: Removing medical devices  B: Breathe   SBT: Not attempted   C: Coordinate A & B, analgesics/sedatives   Pain: managed    SAT: Not attempted  D: Delirium   CAM-ICU: Overall CAM-ICU: Positive  E: Early(intubated/ Progressive (non-intubated) Mobility   MOVE Screen: Fail   Activity: Activity Management: Patient unable to perform activities  FAS: Feeding/Nutrition   Diet order: Diet/Nutrition Received: NPO,    T: Thrombus   DVT prophylaxis: VTE Required Core Measure: Pharmacological prophylaxis initiated/maintained  H: HOB Elevation   Head of Bed (HOB) Positioning: HOB at 30 degrees  U: Ulcer Prophylaxis   GI: yes  G: Glucose control   managed Glycemic Management: blood glucose monitored  S: Skin   Bathing/Skin Care: dressed/undressed, bath, partial, incontinence care  Device Skin Pressure Protection: absorbent pad utilized/changed  Pressure Reduction Devices: foam padding utilized  Pressure Reduction Techniques: pressure points protected  Skin Protection: adhesive use limited  B: Bowel Function   no issues   I: Indwelling Catheters   Rosas necessity:      Urethral Catheter 07/11/22 1147-Reason for Continuing Urinary Catheterization: Critically ill in ICU and requiring hourly monitoring of intake/output   CVC necessity: No  D: De-escalation Antibiotics   Yes    Family/Goals of care/Code Status   Code Status: Full Code    24H Vital Sign Range  Temp:  [98.3 °F (36.8 °C)-102.4 °F (39.1 °C)]   Pulse:  []   Resp:  [23-36]   BP: ()/(51-82)   SpO2:  [90 %-98 %]      Shift Events   Prop & Fent continued. Decreased urine output this shift. Maps has been soft notified MD. Tmax 102.4 with no relief with Tylenol, ice packs helped temporarily. No acute events throughout shift    VS and assessment per flow sheet, patient progressing towards goals as  tolerated, plan of care reviewed with family, all concerns addressed, will continue to monitor.

## 2022-07-14 NOTE — PROGRESS NOTES
[facility]  Hematology  Bone Marrow Transplant  Progress Note    Patient Name: Kimmy Bass  Admission Date: 7/10/2022  Hospital Length of Stay: 4 days  Code Status: Full Code    Subjective:     Interval History: intubated yesterday evening, sedated. FiO2 at 60%. Hypotensive overnight and Norepi started, stopped this am. T.max 102.4 at 11pm. BC obtained this am. On Cefepime and Vanc restarted. Creatinine up to 1.8 with low urine output overnight. Uric acid up to 9.2, allopurinol increased to bid. 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    Objective:     Vital Signs (Most Recent):  Temp: (Abnormal) 100.6 °F (38.1 °C) (07/14/22 0800)  Pulse: 99 (07/14/22 1306)  Resp: (Abnormal) 30 (07/14/22 1306)  BP: (Abnormal) 100/55 (07/14/22 1306)  SpO2: (Abnormal) 94 % (07/14/22 1306)   Vital Signs (24h Range):  Temp:  [98.3 °F (36.8 °C)-102.4 °F (39.1 °C)] 100.6 °F (38.1 °C)  Pulse:  [] 99  Resp:  [24-33] 30  SpO2:  [90 %-100 %] 94 %  BP: ()/(51-76) 100/55  Arterial Line BP: (111)/(48) 111/48     Weight: 96.4 kg (212 lb 8.4 oz)  Body mass index is 40.16 kg/m².  Body surface area is 2.04 meters squared.        Intake/Output - Last 3 Shifts       Intake/Output Category 07/12 0700 to 07/13 0659 07/13 0700 to 07/14 0659 07/14 0700 to 07/15 0659    I.V. (mL/kg) 49.3 (0.5) 356.6 (3.7) 194.9 (2)    Blood 375 232.5     NG/GT  90     IV Piggyback 575.2 435.5     Total Intake(mL/kg) 999.5 (10.4) 1114.5 (11.6) 194.9 (2)    Urine (mL/kg/hr) 1185 (0.5) 1654 (0.7) 20 (0)    Stool  0     Total Output 1185 1654 20    Net -185.5 -539.5 +174.9           Stool Occurrence  0 x             Physical Exam  Vitals and nursing note reviewed.   Constitutional:       Appearance: She is ill-appearing and toxic-appearing.      Interventions: She is sedated and intubated.   HENT:      Head: Normocephalic and atraumatic.      Right Ear: External ear normal.       Left Ear: External ear normal.      Mouth/Throat:      Mouth: Mucous membranes are dry.      Pharynx: No posterior oropharyngeal erythema.   Eyes:      Extraocular Movements: Extraocular movements intact.      Conjunctiva/sclera: Conjunctivae normal.   Cardiovascular:      Rate and Rhythm: Normal rate and regular rhythm.      Pulses: Normal pulses.      Heart sounds: Normal heart sounds.   Pulmonary:      Effort: Tachypnea present. No respiratory distress. She is intubated.      Breath sounds: Decreased breath sounds and rales present.      Comments: intubated  Abdominal:      General: Bowel sounds are normal.      Palpations: Abdomen is soft.      Tenderness: There is no abdominal tenderness.   Genitourinary:     Comments: Rosas in place  Musculoskeletal:         General: Swelling (generalized) present. Normal range of motion.      Right lower leg: Edema present.      Left lower leg: Edema present.   Skin:     General: Skin is warm and dry.      Coloration: Skin is cyanotic (fingertips and toes).      Findings: Bruising present. No erythema or rash.   Neurological:      General: No focal deficit present.      Mental Status: She is oriented to person, place, and time.      GCS: GCS eye subscore is 2. GCS verbal subscore is 1. GCS motor subscore is 5.      Motor: Weakness (generalized) present.       Significant Labs:   CBC:   Recent Labs   Lab 07/13/22  1755 07/13/22  2342 07/14/22  0618 07/14/22  1204   WBC 21.28* 21.00* 25.82* 26.37*   HGB 7.9* 8.2* 7.3* 6.8*   HCT 23.3* 23.0* 20.9* 19.9*   PLT 15* 22* 28*  --    , CMP:   Recent Labs   Lab 07/13/22  0326 07/14/22  0341   * 129*   K 4.5 3.8   CL 95 97   CO2 19* 19*   * 116*   BUN 35* 41*   CREATININE 0.8 1.8*   CALCIUM 8.0* 8.0*   PROT 6.2 6.3   ALBUMIN 2.4* 2.2*   BILITOT 0.9 1.6*   ALKPHOS 92 123   * 160*   * 280*   ANIONGAP 12 13   EGFRNONAA >60.0 28.5*   , Coagulation:   Recent Labs   Lab 07/13/22  1108 07/14/22  0339   INR 1.2 1.3*    APTT 34.6* 29.6   , LDH: No results for input(s): LDHCSF, BFSOURCE in the last 48 hours., and Uric Acid   Recent Labs   Lab 07/13/22  1108 07/14/22  0341   URICACID 8.0* 9.2*       Diagnostic Results:  I have reviewed all pertinent imaging results/findings within the past 24 hours.    Assessment/Plan:     * Acute hypoxemic respiratory failure  - low grade fevers on admission  - RSV, Influenza, COVID negative.   - CXR demonstrated no acute cardiopulmonary process / focal consolidation.   - OSH CTA chest ordered for concerns for PE given elevated dimer and dyspnea, negative for PE.  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 Will increase to bid today  - WBC up to 26k, consider hydrea if continue to rise  - cryoglobulin and cold agglutinin in process  - on ppx acyclovir and diflucan    Leukocytosis  - WBC 17K on arrival to [facility], WBC up to 26k this am  - stable  - continue to monitor with at least daily CBC  - consider hydrea if WBC continues to rise  - checking cold agglutinin and cryoglobulin given suspected perfusion issues    Anemia in neoplastic disease  - monitor at least daily CBC  - transfuse for Hgb <7 or can maintain >8  with any symptoms of ACS/demand needs    Thrombocytopenia  - monitor at least daily CBC  - Transfuse if PLT < 10K or bleeding    Transaminitis  - probable acute liver injury following respiratory failure/shock  - noted steatosis on CT  - continue to trend, stable this am  - avoid hepato toxic meds as able    RAMAN (acute kidney injury)  - creatinine 1.8 this am with low urine output overnight  - possible result of medication (contrast x 2 for CT scan, Vancomycin)  - also possible component of TLS, uric acid 9.2 this am. Allopurinol increased to bid  - managed by critical care      Atrial fibrillation with RVR  - episode on 7/13  - converted with IV Metoprolol    Hypophosphatemia  -electrolyte replacement per critical care    HTN (hypertension)  Home meds: HCTZ 25 mg, Toprol XL 50 qd  - BP meds on hold due to hypotension  - hypotensive overnight and Norepi started    HLD (hyperlipidemia)  - on atorvastatin at home  - holding given transaminitis    CAD S/P percutaneous coronary angioplasty  - hold ASA with thrombocytopenia  - consider holding atorvastatin with new transaminitis  - No role for medical therapy or further cardiac w/u at this time per critical care    KIRBY on CPAP  - CPAP at home  - currently on vent for hypoxemia    GERD (gastroesophageal reflux disease)  - continue home daily Pepcid         VTE Risk Mitigation (From admission, onward)         Ordered     IP VTE LOW RISK PATIENT  Once         07/11/22 0027     Place sequential compression device  Until discontinued         07/11/22 0027                Disposition: remains in ICU, step down to BMT when clinically stable/appropriate    Essence Aponte NP  Bone Marrow Transplant  [facility] - [facility]

## 2022-07-14 NOTE — EICU
Rounding (Video Assessment):  Yes    Intervention Initiated From:  Bedside    Gina Communicated with Bedside Nurse regarding:  Time-Out    Nurse Notified:  Yes    Doctor Notified:  No    Comments: Privileges verified on Cat Powers NP. Aware of pts 28  Time out completed with GWEN Salas  1131 Site cleansed with chlora prep.  1133 Sterile blue towels draped around site  1135 Using live US, inserted angiocatheter 20 gauze . Connected to pressure cable. Calibrated  1137 Sutured down catheter, Biopatch applied, then covered with tegaderm. Patient is sedated.

## 2022-07-14 NOTE — ASSESSMENT & PLAN NOTE
Patient transferred to [facility] for BMT eval with new leukocytosis with anemia/thrombocytopenia.    - BMT following  - TUCKER and Mycoplasma sent 7/14  - Bone marrow biopsy 7/12; results pending

## 2022-07-14 NOTE — ASSESSMENT & PLAN NOTE
Home meds: HCTZ 25 mg, Toprol XL 50 qd  - BP meds on hold due to hypotension  - hypotensive overnight and Norepi started

## 2022-07-14 NOTE — SUBJECTIVE & OBJECTIVE
Interval History/Significant Events: Intubated yesterday. Fingers are more cyanotic.    Review of Systems   Unable to perform ROS: Intubated   Objective:     Vital Signs (Most Recent):  Temp: (!) 100.6 °F (38.1 °C) (07/14/22 0800)  Pulse: 102 (07/14/22 1200)  Resp: (!) 30 (07/14/22 1200)  BP: (!) 100/54 (07/14/22 1200)  SpO2: (!) 94 % (07/14/22 1200)   Vital Signs (24h Range):  Temp:  [98.3 °F (36.8 °C)-102.4 °F (39.1 °C)] 100.6 °F (38.1 °C)  Pulse:  [] 102  Resp:  [23-33] 30  SpO2:  [90 %-100 %] 94 %  BP: ()/(51-76) 100/54  Arterial Line BP: (111)/(48) 111/48   Weight: 96.4 kg (212 lb 8.4 oz)  Body mass index is 40.16 kg/m².      Intake/Output Summary (Last 24 hours) at 7/14/2022 1227  Last data filed at 7/14/2022 1200  Gross per 24 hour   Intake 740.81 ml   Output 549 ml   Net 191.81 ml       Physical Exam  Constitutional:       Appearance: She is ill-appearing.      Interventions: She is sedated and intubated.   HENT:      Head: Normocephalic and atraumatic.   Cardiovascular:      Rate and Rhythm: Normal rate. Rhythm irregular.      Heart sounds: Murmur heard.   Pulmonary:      Effort: She is intubated.      Breath sounds: Decreased breath sounds present. No wheezing, rhonchi or rales.   Abdominal:      General: Abdomen is protuberant.      Palpations: Abdomen is soft.      Tenderness: There is no abdominal tenderness. There is no guarding or rebound.   Musculoskeletal:      Cervical back: Neck supple.   Skin:     Comments: Dusky fingertips   Neurological:      GCS: GCS eye subscore is 2. GCS verbal subscore is 1. GCS motor subscore is 4.       Vents:  Vent Mode: A/C (07/14/22 1100)  Set Rate: 26 BPM (07/14/22 1100)  Vt Set: 340 mL (07/14/22 1100)  PEEP/CPAP: 8 cmH20 (07/14/22 1100)  Oxygen Concentration (%): 70 (07/14/22 1200)  Peak Airway Pressure: 28 cmH2O (07/14/22 1100)  Plateau Pressure: 20 cmH20 (07/14/22 1100)  Total Ve: 10.9 mL (07/14/22 1100)  Negative Inspiratory Force (cm H2O): 0 (07/14/22  1100)  F/VT Ratio<105 (RSBI): 186.21 (07/14/22 1100)  Lines/Drains/Airways       Drain  Duration                  Urethral Catheter 07/11/22 1147 3 days         NG/OG Tube 07/13/22 1518 Ada sump Right mouth <1 day              Airway  Duration                  Airway - Non-Surgical 07/13/22 1503 Endotracheal Tube <1 day              Arterial Line  Duration             Arterial Line 07/14/22 1135 Right Radial <1 day              Peripheral Intravenous Line  Duration                  Midline Catheter Insertion/Assessment  - Single Lumen 07/12/22 1105 Left cephalic vein (lateral side of arm) 20g x 10cm 2 days         Peripheral IV - Single Lumen 07/11/22 1500 20 G Anterior;Distal;Right Upper Arm 2 days         Peripheral IV - Single Lumen 07/14/22 0947 20 G;1 3/4 in Left Forearm <1 day                  Significant Labs:    CBC/Anemia Profile:  Recent Labs   Lab 07/13/22  1755 07/13/22  2342 07/14/22  0618   WBC 21.28* 21.00* 25.82*   HGB 7.9* 8.2* 7.3*   HCT 23.3* 23.0* 20.9*   PLT 15* 22* 28*   MCV 94 91 93   RDW 19.4* 19.6* 20.7*        Chemistries:  Recent Labs   Lab 07/13/22  0326 07/14/22  0341   * 129*   K 4.5 3.8   CL 95 97   CO2 19* 19*   BUN 35* 41*   CREATININE 0.8 1.8*   CALCIUM 8.0* 8.0*   ALBUMIN 2.4* 2.2*   PROT 6.2 6.3   BILITOT 0.9 1.6*   ALKPHOS 92 123   * 280*   * 160*   MG 2.4 2.4   PHOS 2.1* 2.7       All pertinent labs within the past 24 hours have been reviewed.    Significant Imaging:  I have reviewed all pertinent imaging results/findings within the past 24 hours.

## 2022-07-14 NOTE — PLAN OF CARE
Recommendations     1. If/when medically able, initiate enteral nutrition. Rec'd Peptamen Intense VHP @ 50 mL/hr to provide 1200 kcals, 110 g of protein, 1008 mL fluid.  2. RD to monitor & follow-up.     Goals: Meet % EEN, EPN by RD f/u date  Nutrition Goal Status: new  Communication of RD Recs: reviewed with RN

## 2022-07-14 NOTE — ASSESSMENT & PLAN NOTE
- newly noted leukocytosis with anemia/thrombocytopenia  - peripheral blasts noted >20%  - flow/smear from 7/11 suspicious for AML vs AMML, vs CMML  - bone marrow biopsy completed 7/12, HYPERCELLULAR MARROW (60-70%) WITH ACUTE MYELOID LEUKEMIA, NON-M3 SUBTYPE  - Hep B testing in process, HIV negative  - Echo done 7/11 however poor image due to tachycardia; repeating done showing 63% EF  - uric acid up to 9.2 today, allopurinol 300 mg daily started 7/13. Will increase to bid today  - WBC up to 26k, consider hydrea if continue to rise  - cryoglobulin and cold agglutinin in process  - on ppx acyclovir and diflucan

## 2022-07-14 NOTE — PROCEDURES
"Kimmy Bass is a 67 y.o. female patient.    Temp: (!) 100.6 °F (38.1 °C) (07/14/22 0800)  Pulse: 102 (07/14/22 1200)  Resp: (!) 30 (07/14/22 1200)  BP: (!) 100/54 (07/14/22 1200)  SpO2: (!) 94 % (07/14/22 1200)  Weight: 96.4 kg (212 lb 8.4 oz) (07/14/22 1200)  Height: 5' 1" (154.9 cm) (07/14/22 1200)       Arterial Line    Date/Time: 7/14/2022 12:18 PM  Location procedure was performed: [facility]  Performed by: Cat Powers NP  Authorized by: Cat Powers NP   Pre-op Diagnosis: respiratory failure  Post-operative diagnosis: respiratory failure  Consent Done: Yes  Consent: Written consent obtained.  Risks and benefits: risks, benefits and alternatives were discussed  Consent given by: daughter.  Patient identity confirmed: name and MRN  Time out: Immediately prior to procedure a "time out" was called to verify the correct patient, procedure, equipment, support staff and site/side marked as required.  Preparation: Patient was prepped and draped in the usual sterile fashion.  Indications: multiple ABGs, respiratory failure and hemodynamic monitoring  Location: right radial  Rc's test normal: yes  Needle gauge: 20  Seldinger technique: Seldinger technique used  Number of attempts: 1  Post-procedure: dressing applied and line sutured  Post-procedure CMS: unchanged  Patient tolerance: Patient tolerated the procedure well with no immediate complications          7/14/2022  "

## 2022-07-14 NOTE — ASSESSMENT & PLAN NOTE
- low grade fevers on admission  - RSV, Influenza, COVID negative.   - CXR demonstrated no acute cardiopulmonary process / focal consolidation.   - OSH CTA chest ordered for concerns for PE given elevated dimer and dyspnea, negative for PE.  - sent to ICU on arrival to BMT unit for worsening resp status  - intubated 7/13, on 60% FiO2  - BCx x 2 NGTD  - CXR and CT c/a/p unrevealing for resp cause; small bilateral pleural effusions noted  -   - lacate now wnl   - continue cefepime/Vanc   - further management per ICU

## 2022-07-14 NOTE — ASSESSMENT & PLAN NOTE
Creatinine elevated 7/14: 1.8. Maintaining UOP    --No more diuresis  --Strict I&O  --Avoid nephrotoxic medications  --Renally dose medications

## 2022-07-15 LAB
ANA SER QL IF: NORMAL
DNA/RNA EXTRACT AND HOLD RESULT: NORMAL
DNA/RNA EXTRACTION: NORMAL
EXHR SPECIMEN TYPE: NORMAL
NPM1 FINAL DIAGNOSIS (BONE MARROW): NORMAL
NPM1 SIGNING PATHOLOGIST: NORMAL
NPM1 SPECIMEN TYPE (BONE MARROW): NORMAL

## 2022-07-15 NOTE — CODE DOCUMENTATION
CODE SUMMARY  Critical Care Medicine    Admit Date: 7/10/2022  LOS: 4    CC: Acute hypoxemic respiratory failure    Code Status: Full Code   Code Date: 2022    CODE Metrics:     Location of CODE: 6078/6078 A  Patient Age: 67 y.o.  MRN: [id number]  Was the patient discharged from an ICU this admission (include date)? no   Was the patient discharged from a PACU within last 24 hours? No  Did the patient receive conscious sedation/general anesthesia within last 24 hours? No  Was the patient in the ED within the past 24 hours? No  Was the patient started on NIPPV within the past 24 hours? No  Attending Physician: Delvin Nunez MD  Primary [place] [facility]  Primary Service Notified?: Yes    CODE Category (Acute Respiratory Compromise or Cardiopulmonary Arrest): Cardiopulmonary Arrest  Time of need for chest compressions or airway support:     Time CPR initiated:   Time CODE Page Received: N/A   Time CODE team arrived: N/A Team at bedside  First documented rhythm: Asystole  Time to first epinephrine given:   Time to first defibrillation: N/A  Time to intubation: Pt already intubated  ROSC? (if yes provide time): No  Post cardiac arrest hypothermia considered?: No  Disposition (transferred to ICU, , etc):     CODE Team Members:  TRACY Resident/Fellow: MD Bhavesh  Cardiology Fellow: N/A  Anesthesia Resident: N/A  [facility] APC: KYLEE Patino  CODE : MD Bhavesh  CODE Team Recorder: JADYN Reis  CNICU Nurse #1: Erum Jacob  CNICU Nurse #2:     SUBJECTIVE:     Events preceding cardiopulmonary arrest: Pt with worsening shock. Pt decompensating with increasing oxygen requirements, increasing pressor requirements.  Now up to Levo @ 0.4. RIJ Trialysis line placed, line confirmed, no pneumothorax. ABG 7.203/32.1/74/12.7/-15. 1 amp of bicarb given. Pt became bradycardic and ultimately was found to be in asystole. CPR started.     Objective:     Physical Exam:  Last  Vitals:  Vitals:    07/14/22 2128   BP: (!) 72/32   Pulse: (!) 124   Resp:    Temp:      GA: Comatose, unresponsive.  HEENT: No scleral icterus or JVD.   Pulmonary: Apneic. Clear to auscultation A/P/L.   Cardiac: Pulseless. No chest deformities.   Abdominal: No visible abdominal lesions. No appreciable hepatosplenomegaly.  Neuro:  --GCS: E1 V1 M1  --Mental Status:  Intubated, Sedated    IV Access PTA::  Trialysis (Dialysis) Catheter 07/14/22 2037 right internal jugular (Active)           Peripheral IV - Single Lumen 07/11/22 1500 20 G Anterior;Distal;Right Upper Arm (Active)   Site Assessment Clean;Dry;Intact 07/14/22 1200   Extremity Assessment Distal to IV No abnormal discoloration;No redness;No swelling 07/14/22 1200   Line Status No blood return;Flushed;Saline locked 07/14/22 1200   Dressing Status Clean;Dry;Intact 07/14/22 1200   Dressing Intervention Integrity maintained 07/14/22 1200   Dressing Change Due 07/15/22 07/14/22 0300   Site Change Due 07/15/22 07/14/22 0300   Reason Not Rotated Not due 07/14/22 0300            Peripheral IV - Single Lumen 07/14/22 0947 20 G;1 3/4 in Left Forearm (Active)   Site Assessment Clean;Dry;Intact 07/14/22 1200   Extremity Assessment Distal to IV No abnormal discoloration;No redness;No swelling 07/14/22 1200   Line Status Blood return noted;Flushed;Saline locked 07/14/22 1200   Dressing Status Clean;Dry;Intact 07/14/22 1200   Dressing Intervention Integrity maintained 07/14/22 1200   Site Change Due 07/18/22 07/14/22 1000            Midline Catheter Insertion/Assessment  - Single Lumen 07/12/22 1105 Left cephalic vein (lateral side of arm) 20g x 10cm (Active)   $ Midline Charges (Upon insertion) Bedside Insertion Performed Pt > 3 Years Old 07/12/22 1500   Site Assessment Clean;Dry;Intact 07/14/22 1200   IV Device Securement catheter securement device 07/14/22 1200   Line Status Infusing 07/14/22 1200   Dressing Type Biopatch in place 07/14/22 1200   Dressing Status  Clean;Intact;Dry 07/14/22 1200   Dressing Intervention Integrity maintained 07/14/22 1200   Dressing Change Due 07/19/22 07/14/22 0300   Site Change Due 08/10/22 07/14/22 0300   Reason Not Rotated Not due 07/14/22 0300       Labs:  ABG:   Recent Labs   Lab 07/14/22 2101   PH 7.203*   PO2 74*   PCO2 32.1*   HCO3 12.7*   POCSATURATED 91*   BE -15     BMP:  Recent Labs   Lab 07/14/22  0341 07/14/22 2007   * 124*   K 3.8 5.0   CL 97 96   CO2 19* 13*   BUN 41* 52*   CREATININE 1.8* 3.0*   * 230*   MG 2.4  --    PHOS 2.7  --      LFT:   Lab Results   Component Value Date     (H) 07/14/2022     (H) 07/14/2022    ALKPHOS 147 (H) 07/14/2022    BILITOT 2.3 (H) 07/14/2022    ALBUMIN 1.9 (L) 07/14/2022    PROT 5.9 (L) 07/14/2022     CBC:   Lab Results   Component Value Date    WBC 38.19 (H) 07/14/2022    HGB 8.3 (L) 07/14/2022    HCT 24.4 (L) 07/14/2022    MCV 94 07/14/2022    PLT 40 (L) 07/14/2022       CODE Timeline: Time/Event     2107 / Epinephrine 0.1mg given    2107 / Sodium bicarbonate 1 amp given    2110 / Rhythm check PEA, backboard placed, resume compressions    2111 / Epinephrine 0.1mg given    2112 / Rhythm check, PEA    2112 / Calcium chloride given    2114 / Rhythm check PEA    2114 / Epinephrine 0.1mg given    2114 / Sodium bicarb 1 amp given    2116 / Rhythm check, pt with pulse, sinus tachycardia vs. afib RVR on the monitor    2119 / Pulse and blood pressure decreasing    2121 / Pulse not palpable, PEA on the monitor    2121 / Epinephrine x 1    2122 / 1 amp D50 given    2123 / Rhythm check, PEA     2125 / Rhythm check, PEA    2126 / Epinephrine x 1    2128 / Time of death called at 2128      Critical Care Time (uninterrupted) 30 minutes    Yari Bradshaw [facility]  Critical Care Medicine  07/14/2022 11:17 PM

## 2022-07-15 NOTE — PROCEDURES
"Kimmy Bass is a 67 y.o. female patient.    Temp: (!) 101 °F (38.3 °C) (07/14/22 1935)  Pulse: (!) 124 (07/14/22 2128)  Resp: (!) 28 (07/14/22 2108)  BP: (!) 72/32 (07/14/22 2128)  SpO2: (!) 84 % (07/14/22 2128)  Weight: 96.4 kg (212 lb 8.4 oz) (07/14/22 1200)  Height: 5' 1" (154.9 cm) (07/14/22 1200)       Central Line    Date/Time: 7/14/2022 11:18 PM  Performed by: Yari Bradshaw DNP  Authorized by: Yari Bradshaw DNP     Location procedure was performed:  [facility] CRITICAL CARE MEDICINE  Pre-operative diagnosis:  Shock  Post-operative diagnosis:  Shock  Consent Done ?:  Yes  Time out complete?: Verified correct patient, procedure, equipment, staff, and site/side    Indications:  Hemodialysis, vascular access and hemodynamic monitoring  Preparation:  Skin prepped with ChloraPrep  Location:  Right internal jugular  Catheter type:  Trialysis  Catheter size:  13 Fr  Ultrasound guidance: Yes    Vessel Caliber:  Medium   patent  Comprressibility:  Normal  Needle advanced into vessel with real time ultrasound guidance.    Guidewire confirmed in vessel.    Steril sheath on probe.    Sterile gel used.  Manometry: No    Number of attempts:  1  Securement:  Line sutured, chlorhexidine patch, sterile dressing applied and blood return through all ports  XRay:  Placement verified by x-ray and successful placement  Adverse Events:  NoneTermination Site: superior vena cava        7/14/2022   AMMON Brown  Critical Care Medicine  07/14/2022 11:20 PM        "

## 2022-07-15 NOTE — SIGNIFICANT EVENT
Death Note      Called to bedside by patient's nurse. Nursing supervisor notified. Family at bedside.  has been called and is also at bedside.    Patient is not responding to verbal or tactile stimuli.   Patient does not have a pupillary or corneal reflex.   Patient's pupils are fixed and dilated.   No heart or breath sounds on auscultation.   No respirations.   No palpable pulses.     Time of death: 9:28 PM     Cause of Death: Cardiac arrest      Cb Agarwal MD  Internal Medicine Resident  [email or web address]

## 2022-07-15 NOTE — DISCHARGE SUMMARY
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        Discharged Condition:     HPI:   This is a 66 y/o F PMhx CAD, GERD, HTN, KIRBY w/ CPAP, adenomatous polyp of transverse colon 2018, extensive cancer history in father (brain), mother (bladder), brother (lymphoma) - all  from cancer. Patient presented to OSH due to progressive feelings of malaise, rhinorrhea, epistaxis, pleuretic chest pain / chest tightness, subjective fevers, night sweats over the course of approximately 10 days. Patient had visited walk-in clinic 3 days prior to OSH presentation, left with Rx Augmentin. At that time influenza and COVID tests were negative. Patient continued to have aforementioned symptoms despite antibiotic initiation, and with increasing subjective fevers. She then presented to [facility] for further evaluation as she began to experience symptoms of  dyspnea. Patient was mildly hypertensive and afebrile (T max 100.2 per report) on arrival to OSH. Patient dyspneic on arrival, requiring 2-4 L NC to maintain SpO2 mid 90s. Labs remarkable for thrombocytopenia 13, anemia 9.9, elevated lactate 2.29 > 2.46, elevated , fibrinogen 560, D-dimer 7650. RSV, Influenza, COVID negative. CXR demonstrated no acute cardiopulmonary process. CTA chest ordered for concerns for PE given elevated dimer and dyspnea, negative for PE. Heme-onc evaluated, c/f acute leukemia given elevated d-dimer, peripheral smear demonstrating mononuclear cells, transfered to [facility] [place] for further evaluation. Of note, no palpable LN / weight loss.           Hospital/ICU Course:  Admitted to BMT service on  for further evaluation of possible new acute leukemia diagnosis. The morning after admission to [facility] [place] on , patient became acutely tachypneic, hypoxic, with complaints of chest and back pain. Critical care consulted by rapid response for transfer for hypoxemic respiratory failure requiring bipap. Weaned to comfort flow on ; bone marrow biopsy completed. Intubated on  for increased work of breathing and worsening alkalosis.    On the evening of  patient began acutely decompensating requiring escalating vasopressor requirements. The patient became bradycardic and was found to not have a pulse. CPR was initiated. The patient received 6 of epinephrine, 2 amps of bicarb, calcium x 1, 1 amp of D50, 10 units of insulin IV. Despite our best efforts ROSC was not regained. The patient was pronounced  at . Family notified. Condolences given.     Yari Bradshaw [facility]-  Critical Care Medicine  2022 10:24 PM

## 2022-07-15 NOTE — NURSING
1915: Upon initial assessment, pt saturating 85% SpO2 on 80% FiO2, PEEP of 5. RT increased FiO2 to 100% and PEEP to 8.     2000 Bp 60s/50s, trouble shooted arterial line, obtained Bp cuff pressure, 70s/40s. Up titrated levo to maintain MAP goal >65. Team made aware of up titration.  CMP, Lactic, and Type and Screen collected and sent. BG collected.     2044 Lactic acid 4.3    2030 A. Holdridge, NP placed trialysis. Pt O2 sats continued to drop, sterile field maintained per MD order. ABG obtained, Per NP order, 1 amp of bicarb given.     2100 rhythm change noted. HR went from 120 to 70s. Pulse checked, and no pulse detected. Compressions started immediately.     2128 Time of death pronounced by RODOLFO Donaldson MD.     2130 gtts discontinued per MD order,  called and team spoke with family.

## 2022-07-16 LAB
BACTERIA BLD CULT: NORMAL
BACTERIA BLD CULT: NORMAL

## 2022-07-18 LAB
AML FISH REASON FOR REFERRAL (BM): NORMAL
ANNOTATION COMMENT IMP: NORMAL
CELLS W CYTOGENETIC ABNL BLD/T: NORMAL
CHROM ANALY RESULT (ISCN): NORMAL
CHROM BANDING METHOD: NORMAL
CHROMOSOME ANALYSIS BM ADDITIONAL INFORMATION: NORMAL
CHROMOSOME ANALYSIS BM RELEASED BY: NORMAL
CHROMOSOME ANALYSIS BM RESULT SUMMARY: NORMAL
CLINICAL CYTOGENETICIST REVIEW: NORMAL
CLINICAL CYTOGENETICIST REVIEW: NORMAL
KARYOTYP MAR: NORMAL
LAB TEST METHOD: NORMAL
MOL DX INTERP BLD/T QL: NORMAL
PROVIDER SIGNING NAME: NORMAL
REASON FOR REFERRAL (NARRATIVE): NORMAL
REF LAB TEST METHOD: NORMAL
SPECIMEN SOURCE: NORMAL
SPECIMEN SOURCE: NORMAL
SPECIMEN: NORMAL
TEST PERFORMANCE INFO SPEC: NORMAL

## 2022-07-19 LAB — BACTERIA BLD CULT: NORMAL

## 2022-07-20 LAB — M PNEUMO IGM SER IA-ACNC: 139 U/ML

## 2022-07-22 LAB
HBV CORE AB SERPL QL IA: NEGATIVE
HBV SURFACE AG SERPL QL IA: NEGATIVE

## 2022-08-02 LAB
ANNOTATION COMMENT IMP: NORMAL
NGS CLINCIAL TRIALS: NORMAL
NGS INDICATION OF TEST: NORMAL
NGS INTERPRETATION: NORMAL
NGS ONCOHEME PANEL GENE LIST: NORMAL
NGS PATHOGENIC MUTATIONS DETECTED: NORMAL
NGS REVIEWED BY:: NORMAL
NGS VARIANTS OF UNKNOWN SIGNIFICANCE: NORMAL
NGSHM RESULT, BONE MARROW: NORMAL
REF LAB TEST METHOD: NORMAL
SPECIMEN SOURCE: NORMAL
TEST PERFORMANCE INFO SPEC: NORMAL